# Patient Record
Sex: MALE | URBAN - METROPOLITAN AREA
[De-identification: names, ages, dates, MRNs, and addresses within clinical notes are randomized per-mention and may not be internally consistent; named-entity substitution may affect disease eponyms.]

---

## 2018-04-01 ENCOUNTER — OUTPATIENT (OUTPATIENT)
Dept: OUTPATIENT SERVICES | Facility: HOSPITAL | Age: 58
LOS: 1 days | End: 2018-04-01
Payer: MEDICAID

## 2018-04-01 PROCEDURE — G9001: CPT

## 2018-04-12 DIAGNOSIS — R69 ILLNESS, UNSPECIFIED: ICD-10-CM

## 2024-07-06 ENCOUNTER — INPATIENT (INPATIENT)
Facility: HOSPITAL | Age: 64
LOS: 2 days | Discharge: HOME | DRG: 378 | End: 2024-07-09
Attending: HOSPITALIST | Admitting: STUDENT IN AN ORGANIZED HEALTH CARE EDUCATION/TRAINING PROGRAM
Payer: MEDICAID

## 2024-07-06 VITALS
DIASTOLIC BLOOD PRESSURE: 68 MMHG | SYSTOLIC BLOOD PRESSURE: 111 MMHG | WEIGHT: 229.94 LBS | TEMPERATURE: 99 F | HEIGHT: 67 IN | RESPIRATION RATE: 20 BRPM | OXYGEN SATURATION: 100 % | HEART RATE: 120 BPM

## 2024-07-06 LAB
ALBUMIN SERPL ELPH-MCNC: 3.4 G/DL — SIGNIFICANT CHANGE UP (ref 3.3–5)
ALP SERPL-CCNC: 38 U/L — LOW (ref 40–120)
ALT FLD-CCNC: 19 U/L — SIGNIFICANT CHANGE UP (ref 10–45)
ANION GAP SERPL CALC-SCNC: 9 MMOL/L — SIGNIFICANT CHANGE UP (ref 5–17)
APTT BLD: 28.2 SEC — SIGNIFICANT CHANGE UP (ref 24.5–35.6)
AST SERPL-CCNC: 15 U/L — SIGNIFICANT CHANGE UP (ref 10–40)
BASE EXCESS BLDV CALC-SCNC: -4.1 MMOL/L — LOW (ref -2–3)
BASOPHILS # BLD AUTO: 0.03 K/UL — SIGNIFICANT CHANGE UP (ref 0–0.2)
BASOPHILS # BLD AUTO: 0.03 K/UL — SIGNIFICANT CHANGE UP (ref 0–0.2)
BASOPHILS NFR BLD AUTO: 0.3 % — SIGNIFICANT CHANGE UP (ref 0–2)
BASOPHILS NFR BLD AUTO: 0.3 % — SIGNIFICANT CHANGE UP (ref 0–2)
BILIRUB SERPL-MCNC: 0.3 MG/DL — SIGNIFICANT CHANGE UP (ref 0.2–1.2)
BLD GP AB SCN SERPL QL: NEGATIVE — SIGNIFICANT CHANGE UP
BUN SERPL-MCNC: 53 MG/DL — HIGH (ref 7–23)
CA-I SERPL-SCNC: 1.19 MMOL/L — SIGNIFICANT CHANGE UP (ref 1.15–1.33)
CALCIUM SERPL-MCNC: 8.3 MG/DL — LOW (ref 8.4–10.5)
CHLORIDE BLDV-SCNC: 109 MMOL/L — HIGH (ref 96–108)
CHLORIDE SERPL-SCNC: 109 MMOL/L — HIGH (ref 96–108)
CO2 BLDV-SCNC: 24 MMOL/L — SIGNIFICANT CHANGE UP (ref 22–26)
CO2 SERPL-SCNC: 20 MMOL/L — LOW (ref 22–31)
CREAT SERPL-MCNC: 0.84 MG/DL — SIGNIFICANT CHANGE UP (ref 0.5–1.3)
EGFR: 98 ML/MIN/1.73M2 — SIGNIFICANT CHANGE UP
EOSINOPHIL # BLD AUTO: 0.01 K/UL — SIGNIFICANT CHANGE UP (ref 0–0.5)
EOSINOPHIL # BLD AUTO: 0.02 K/UL — SIGNIFICANT CHANGE UP (ref 0–0.5)
EOSINOPHIL NFR BLD AUTO: 0.1 % — SIGNIFICANT CHANGE UP (ref 0–6)
EOSINOPHIL NFR BLD AUTO: 0.2 % — SIGNIFICANT CHANGE UP (ref 0–6)
GAS PNL BLDV: 137 MMOL/L — SIGNIFICANT CHANGE UP (ref 136–145)
GAS PNL BLDV: SIGNIFICANT CHANGE UP
GAS PNL BLDV: SIGNIFICANT CHANGE UP
GLUCOSE BLDV-MCNC: 153 MG/DL — HIGH (ref 70–99)
GLUCOSE SERPL-MCNC: 188 MG/DL — HIGH (ref 70–99)
HCO3 BLDV-SCNC: 23 MMOL/L — SIGNIFICANT CHANGE UP (ref 22–29)
HCT VFR BLD CALC: 24.4 % — LOW (ref 39–50)
HCT VFR BLD CALC: 26.4 % — LOW (ref 39–50)
HCT VFR BLDA CALC: 26 % — LOW (ref 39–51)
HGB BLD CALC-MCNC: 8.8 G/DL — LOW (ref 12.6–17.4)
HGB BLD-MCNC: 8.3 G/DL — LOW (ref 13–17)
HGB BLD-MCNC: 9.1 G/DL — LOW (ref 13–17)
IMM GRANULOCYTES NFR BLD AUTO: 0.5 % — SIGNIFICANT CHANGE UP (ref 0–0.9)
IMM GRANULOCYTES NFR BLD AUTO: 0.6 % — SIGNIFICANT CHANGE UP (ref 0–0.9)
INR BLD: 1.35 RATIO — HIGH (ref 0.85–1.18)
LACTATE BLDV-MCNC: 2 MMOL/L — SIGNIFICANT CHANGE UP (ref 0.5–2)
LIDOCAIN IGE QN: 49 U/L — SIGNIFICANT CHANGE UP (ref 7–60)
LYMPHOCYTES # BLD AUTO: 2.8 K/UL — SIGNIFICANT CHANGE UP (ref 1–3.3)
LYMPHOCYTES # BLD AUTO: 26 % — SIGNIFICANT CHANGE UP (ref 13–44)
LYMPHOCYTES # BLD AUTO: 28.8 % — SIGNIFICANT CHANGE UP (ref 13–44)
LYMPHOCYTES # BLD AUTO: 3.1 K/UL — SIGNIFICANT CHANGE UP (ref 1–3.3)
MCHC RBC-ENTMCNC: 29.4 PG — SIGNIFICANT CHANGE UP (ref 27–34)
MCHC RBC-ENTMCNC: 29.4 PG — SIGNIFICANT CHANGE UP (ref 27–34)
MCHC RBC-ENTMCNC: 34 GM/DL — SIGNIFICANT CHANGE UP (ref 32–36)
MCHC RBC-ENTMCNC: 34.5 GM/DL — SIGNIFICANT CHANGE UP (ref 32–36)
MCV RBC AUTO: 85.4 FL — SIGNIFICANT CHANGE UP (ref 80–100)
MCV RBC AUTO: 86.5 FL — SIGNIFICANT CHANGE UP (ref 80–100)
MONOCYTES # BLD AUTO: 0.54 K/UL — SIGNIFICANT CHANGE UP (ref 0–0.9)
MONOCYTES # BLD AUTO: 0.67 K/UL — SIGNIFICANT CHANGE UP (ref 0–0.9)
MONOCYTES NFR BLD AUTO: 5 % — SIGNIFICANT CHANGE UP (ref 2–14)
MONOCYTES NFR BLD AUTO: 6.2 % — SIGNIFICANT CHANGE UP (ref 2–14)
NEUTROPHILS # BLD AUTO: 6.9 K/UL — SIGNIFICANT CHANGE UP (ref 1.8–7.4)
NEUTROPHILS # BLD AUTO: 7.34 K/UL — SIGNIFICANT CHANGE UP (ref 1.8–7.4)
NEUTROPHILS NFR BLD AUTO: 64 % — SIGNIFICANT CHANGE UP (ref 43–77)
NEUTROPHILS NFR BLD AUTO: 68 % — SIGNIFICANT CHANGE UP (ref 43–77)
NRBC # BLD: 0 /100 WBCS — SIGNIFICANT CHANGE UP (ref 0–0)
NRBC # BLD: 0 /100 WBCS — SIGNIFICANT CHANGE UP (ref 0–0)
PCO2 BLDV: 48 MMHG — SIGNIFICANT CHANGE UP (ref 42–55)
PH BLDV: 7.28 — LOW (ref 7.32–7.43)
PLATELET # BLD AUTO: 212 K/UL — SIGNIFICANT CHANGE UP (ref 150–400)
PLATELET # BLD AUTO: 227 K/UL — SIGNIFICANT CHANGE UP (ref 150–400)
PO2 BLDV: 22 MMHG — LOW (ref 25–45)
POTASSIUM BLDV-SCNC: 5.1 MMOL/L — SIGNIFICANT CHANGE UP (ref 3.5–5.1)
POTASSIUM SERPL-MCNC: 4.8 MMOL/L — SIGNIFICANT CHANGE UP (ref 3.5–5.3)
POTASSIUM SERPL-SCNC: 4.8 MMOL/L — SIGNIFICANT CHANGE UP (ref 3.5–5.3)
PROT SERPL-MCNC: 5.7 G/DL — LOW (ref 6–8.3)
PROTHROM AB SERPL-ACNC: 14.1 SEC — HIGH (ref 9.5–13)
RBC # BLD: 2.82 M/UL — LOW (ref 4.2–5.8)
RBC # BLD: 3.09 M/UL — LOW (ref 4.2–5.8)
RBC # FLD: 12.9 % — SIGNIFICANT CHANGE UP (ref 10.3–14.5)
RBC # FLD: 13 % — SIGNIFICANT CHANGE UP (ref 10.3–14.5)
RH IG SCN BLD-IMP: POSITIVE — SIGNIFICANT CHANGE UP
SAO2 % BLDV: 26 % — LOW (ref 67–88)
SODIUM SERPL-SCNC: 138 MMOL/L — SIGNIFICANT CHANGE UP (ref 135–145)
TROPONIN T, HIGH SENSITIVITY RESULT: 14 NG/L — SIGNIFICANT CHANGE UP (ref 0–51)
WBC # BLD: 10.77 K/UL — HIGH (ref 3.8–10.5)
WBC # BLD: 10.78 K/UL — HIGH (ref 3.8–10.5)
WBC # FLD AUTO: 10.77 K/UL — HIGH (ref 3.8–10.5)
WBC # FLD AUTO: 10.78 K/UL — HIGH (ref 3.8–10.5)

## 2024-07-06 PROCEDURE — 74177 CT ABD & PELVIS W/CONTRAST: CPT | Mod: 26,MC

## 2024-07-06 PROCEDURE — 99291 CRITICAL CARE FIRST HOUR: CPT

## 2024-07-06 RX ORDER — PANTOPRAZOLE SODIUM 40 MG/10ML
40 INJECTION, POWDER, FOR SOLUTION INTRAVENOUS ONCE
Refills: 0 | Status: COMPLETED | OUTPATIENT
Start: 2024-07-06 | End: 2024-07-06

## 2024-07-06 RX ADMIN — PANTOPRAZOLE SODIUM 40 MILLIGRAM(S): 40 INJECTION, POWDER, FOR SOLUTION INTRAVENOUS at 20:56

## 2024-07-06 NOTE — ED ADULT TRIAGE NOTE - CHIEF COMPLAINT QUOTE
"Feeling lightheaded" x 2 days, Rectal bleeding x 2 days initially dark, black stool now stool is red.

## 2024-07-06 NOTE — ED PROVIDER NOTE - PHYSICAL EXAMINATION
General: Alert and Orientated x 3. No apparent distress.  Eyes: No scleral icterus. Pale conjunctiva   ENT: Mucous membranes moist  Cardiac: No murmurs appreciated.   Pulmonary: CTA bilaterally. No increased WOB.   Abdominal: Soft, Non-distended, non-tender  Neurologic: No focal sensory or motor deficits.  Extremities: No lower extremity edema, bruising, soreness   Skin: Color appropriate for race. Intact, warm, and well-perfused.  Psychiatric: Appropriate mood and affect. No apparent risk to self or others.

## 2024-07-06 NOTE — ED PROVIDER NOTE - PROGRESS NOTE DETAILS
John Foster DO (PGY3)  drop in Hgb, vital signs otherwise stable. will give 1uPRBC and admit to tele. GI was emailed.

## 2024-07-06 NOTE — ED ADULT NURSE NOTE - OBJECTIVE STATEMENT
65 y/o Male presents to ED from home with c/o rectal bleeding. Pt states bleeding started about 2 days ago, initially dark now it is red. Pt endorsing intermittent nausea and lightheadedness. Denies SOB, CP, HA, fever, chills, cough, V/D, abd pain, urinary s/s. Pt is A&Ox3, well appearing/ speaking full sentences without difficulty. Airway patent with spontaneous unlabored breathing,  Abd soft, nondistended. Safety maintained bed in lowest position and locked.

## 2024-07-06 NOTE — ED PROVIDER NOTE - OBJECTIVE STATEMENT
63 y/o male hx dm2, htn presents with 3 days rectal bleeding. Symptoms started with hard, dark black stools 3 days ago, for the last 24 hours he has had red, liquid stools (5 episodes today). He has begun feeling very dizzy + short of breath with any exertion, has had a few episodes of minor chest pain as well. He has never had a colonoscopy. Endorses night sweats last two nights, denies known fever/chills, abdominal pain, dysuria/hematuria, leg swelling/pain.

## 2024-07-06 NOTE — ED PROVIDER NOTE - CLINICAL SUMMARY MEDICAL DECISION MAKING FREE TEXT BOX
63 y/o male hx dm2, htn presents with 3 days rectal bleeding, dizziness, shortness of breath. He is hemodynamically stable, afebrile, tachycardic 120, on exam he appears tired with pale conjunctiva. Abdomen is soft, non-tender, non-distended. Concern for lower GI bleed w/ symptomatic anemia. Will get labs, CTA abdomen, monitor. 63 y/o male hx dm2, htn presents with 3 days rectal bleeding, dizziness, shortness of breath. He is hemodynamically stable, afebrile, tachycardic 120, on exam he appears tired with pale conjunctiva. Abdomen is soft, non-tender, non-distended. Concern for lower GI bleed w/ symptomatic anemia. Will get labs, CTA abdomen, monitor. IV hydration ,type and screen ,transfusion admission ZR

## 2024-07-07 DIAGNOSIS — E11.9 TYPE 2 DIABETES MELLITUS WITHOUT COMPLICATIONS: ICD-10-CM

## 2024-07-07 DIAGNOSIS — I10 ESSENTIAL (PRIMARY) HYPERTENSION: ICD-10-CM

## 2024-07-07 DIAGNOSIS — K62.5 HEMORRHAGE OF ANUS AND RECTUM: ICD-10-CM

## 2024-07-07 DIAGNOSIS — Z29.9 ENCOUNTER FOR PROPHYLACTIC MEASURES, UNSPECIFIED: ICD-10-CM

## 2024-07-07 LAB
APTT BLD: 26.2 SEC — SIGNIFICANT CHANGE UP (ref 24.5–35.6)
GLUCOSE BLDC GLUCOMTR-MCNC: 106 MG/DL — HIGH (ref 70–99)
GLUCOSE BLDC GLUCOMTR-MCNC: 142 MG/DL — HIGH (ref 70–99)
GLUCOSE BLDC GLUCOMTR-MCNC: 145 MG/DL — HIGH (ref 70–99)
GLUCOSE BLDC GLUCOMTR-MCNC: 161 MG/DL — HIGH (ref 70–99)
GLUCOSE BLDC GLUCOMTR-MCNC: 231 MG/DL — HIGH (ref 70–99)
HCT VFR BLD CALC: 23.2 % — LOW (ref 39–50)
HCT VFR BLD CALC: 27.7 % — LOW (ref 39–50)
HGB BLD-MCNC: 7.9 G/DL — LOW (ref 13–17)
HGB BLD-MCNC: 9 G/DL — LOW (ref 13–17)
INR BLD: 1.24 RATIO — HIGH (ref 0.85–1.18)
MCHC RBC-ENTMCNC: 28.9 PG — SIGNIFICANT CHANGE UP (ref 27–34)
MCHC RBC-ENTMCNC: 29.8 PG — SIGNIFICANT CHANGE UP (ref 27–34)
MCHC RBC-ENTMCNC: 32.5 GM/DL — SIGNIFICANT CHANGE UP (ref 32–36)
MCHC RBC-ENTMCNC: 34.1 GM/DL — SIGNIFICANT CHANGE UP (ref 32–36)
MCV RBC AUTO: 87.5 FL — SIGNIFICANT CHANGE UP (ref 80–100)
MCV RBC AUTO: 89.1 FL — SIGNIFICANT CHANGE UP (ref 80–100)
NRBC # BLD: 0 /100 WBCS — SIGNIFICANT CHANGE UP (ref 0–0)
NRBC # BLD: 0 /100 WBCS — SIGNIFICANT CHANGE UP (ref 0–0)
PLATELET # BLD AUTO: 204 K/UL — SIGNIFICANT CHANGE UP (ref 150–400)
PLATELET # BLD AUTO: 208 K/UL — SIGNIFICANT CHANGE UP (ref 150–400)
PROTHROM AB SERPL-ACNC: 12.9 SEC — SIGNIFICANT CHANGE UP (ref 9.5–13)
RBC # BLD: 2.65 M/UL — LOW (ref 4.2–5.8)
RBC # BLD: 3.11 M/UL — LOW (ref 4.2–5.8)
RBC # FLD: 13.6 % — SIGNIFICANT CHANGE UP (ref 10.3–14.5)
RBC # FLD: 13.8 % — SIGNIFICANT CHANGE UP (ref 10.3–14.5)
WBC # BLD: 13.37 K/UL — HIGH (ref 3.8–10.5)
WBC # BLD: 13.71 K/UL — HIGH (ref 3.8–10.5)
WBC # FLD AUTO: 13.37 K/UL — HIGH (ref 3.8–10.5)
WBC # FLD AUTO: 13.71 K/UL — HIGH (ref 3.8–10.5)

## 2024-07-07 PROCEDURE — 99223 1ST HOSP IP/OBS HIGH 75: CPT | Mod: GC

## 2024-07-07 PROCEDURE — 99231 SBSQ HOSP IP/OBS SF/LOW 25: CPT

## 2024-07-07 RX ORDER — DEXTROSE MONOHYDRATE 100 MG/ML
125 INJECTION, SOLUTION INTRAVENOUS ONCE
Refills: 0 | Status: DISCONTINUED | OUTPATIENT
Start: 2024-07-07 | End: 2024-07-09

## 2024-07-07 RX ORDER — DEXTROSE 30 % IN WATER 30 %
12.5 VIAL (ML) INTRAVENOUS ONCE
Refills: 0 | Status: DISCONTINUED | OUTPATIENT
Start: 2024-07-07 | End: 2024-07-09

## 2024-07-07 RX ORDER — DEXTROSE MONOHYDRATE AND SODIUM CHLORIDE 5; .3 G/100ML; G/100ML
1000 INJECTION, SOLUTION INTRAVENOUS
Refills: 0 | Status: DISCONTINUED | OUTPATIENT
Start: 2024-07-07 | End: 2024-07-09

## 2024-07-07 RX ORDER — DEXTROSE 30 % IN WATER 30 %
15 VIAL (ML) INTRAVENOUS ONCE
Refills: 0 | Status: DISCONTINUED | OUTPATIENT
Start: 2024-07-07 | End: 2024-07-09

## 2024-07-07 RX ORDER — DEXTROSE 30 % IN WATER 30 %
25 VIAL (ML) INTRAVENOUS ONCE
Refills: 0 | Status: DISCONTINUED | OUTPATIENT
Start: 2024-07-07 | End: 2024-07-09

## 2024-07-07 RX ORDER — PEG3350/SOD SULF,BICARB,CL/KCL 240-22.72G
4000 SOLUTION, RECONSTITUTED, ORAL ORAL ONCE
Refills: 0 | Status: COMPLETED | OUTPATIENT
Start: 2024-07-07 | End: 2024-07-07

## 2024-07-07 RX ORDER — GLUCAGON HYDROCHLORIDE 1 MG/ML
1 INJECTION, POWDER, FOR SOLUTION INTRAMUSCULAR; INTRAVENOUS; SUBCUTANEOUS ONCE
Refills: 0 | Status: DISCONTINUED | OUTPATIENT
Start: 2024-07-07 | End: 2024-07-09

## 2024-07-07 RX ORDER — PANTOPRAZOLE SODIUM 40 MG/10ML
40 INJECTION, POWDER, FOR SOLUTION INTRAVENOUS DAILY
Refills: 0 | Status: DISCONTINUED | OUTPATIENT
Start: 2024-07-07 | End: 2024-07-07

## 2024-07-07 RX ORDER — INSULIN LISPRO 100 [IU]/ML
INJECTION, SOLUTION SUBCUTANEOUS EVERY 6 HOURS
Refills: 0 | Status: DISCONTINUED | OUTPATIENT
Start: 2024-07-07 | End: 2024-07-08

## 2024-07-07 RX ORDER — PANTOPRAZOLE SODIUM 40 MG/10ML
40 INJECTION, POWDER, FOR SOLUTION INTRAVENOUS
Refills: 0 | Status: DISCONTINUED | OUTPATIENT
Start: 2024-07-07 | End: 2024-07-08

## 2024-07-07 RX ADMIN — PANTOPRAZOLE SODIUM 40 MILLIGRAM(S): 40 INJECTION, POWDER, FOR SOLUTION INTRAVENOUS at 18:35

## 2024-07-07 RX ADMIN — INSULIN LISPRO 1: 100 INJECTION, SOLUTION SUBCUTANEOUS at 05:34

## 2024-07-07 RX ADMIN — PANTOPRAZOLE SODIUM 40 MILLIGRAM(S): 40 INJECTION, POWDER, FOR SOLUTION INTRAVENOUS at 05:37

## 2024-07-07 RX ADMIN — Medication 4000 MILLILITER(S): at 18:25

## 2024-07-07 NOTE — PROGRESS NOTE ADULT - SUBJECTIVE AND OBJECTIVE BOX
Patient is a 64y old  Male who presents with a chief complaint of Rectal bleeding (07 Jul 2024 08:49)      SUBJECTIVE / OVERNIGHT EVENTS:    Tele reviewed:       ADDITIONAL REVIEW OF SYSTEMS: Negative except for above    MEDICATIONS  (STANDING):  dextrose 10% Bolus 125 milliLiter(s) IV Bolus once  dextrose 5%. 1000 milliLiter(s) (50 mL/Hr) IV Continuous <Continuous>  dextrose 5%. 1000 milliLiter(s) (100 mL/Hr) IV Continuous <Continuous>  dextrose 50% Injectable 25 Gram(s) IV Push once  dextrose 50% Injectable 12.5 Gram(s) IV Push once  glucagon  Injectable 1 milliGRAM(s) IntraMuscular once  insulin lispro (ADMELOG) corrective regimen sliding scale   SubCutaneous every 6 hours  pantoprazole  Injectable 40 milliGRAM(s) IV Push two times a day    MEDICATIONS  (PRN):  dextrose Oral Gel 15 Gram(s) Oral once PRN Blood Glucose LESS THAN 70 milliGRAM(s)/deciliter      CAPILLARY BLOOD GLUCOSE      POCT Blood Glucose.: 106 mg/dL (07 Jul 2024 10:53)  POCT Blood Glucose.: 142 mg/dL (07 Jul 2024 07:22)  POCT Blood Glucose.: 161 mg/dL (07 Jul 2024 05:33)  POCT Blood Glucose.: 231 mg/dL (07 Jul 2024 03:32)    I&O's Summary      PHYSICAL EXAM:  Vital Signs Last 24 Hrs  T(C): 37.2 (07 Jul 2024 13:35), Max: 37.3 (07 Jul 2024 03:26)  T(F): 99 (07 Jul 2024 13:35), Max: 99.2 (07 Jul 2024 03:26)  HR: 75 (07 Jul 2024 13:35) (75 - 120)  BP: 104/63 (07 Jul 2024 13:35) (94/70 - 128/64)  BP(mean): 76 (07 Jul 2024 02:50) (76 - 90)  RR: 18 (07 Jul 2024 13:35) (18 - 20)  SpO2: 98% (07 Jul 2024 13:35) (98% - 100%)    Parameters below as of 07 Jul 2024 13:35  Patient On (Oxygen Delivery Method): room air        PHYSICAL EXAM:        LABS:                        9.0    13.71 )-----------( 204      ( 07 Jul 2024 06:48 )             27.7     07-06    138  |  109<H>  |  53<H>  ----------------------------<  188<H>  4.8   |  20<L>  |  0.84    Ca    8.3<L>      06 Jul 2024 19:15    TPro  5.7<L>  /  Alb  3.4  /  TBili  0.3  /  DBili  x   /  AST  15  /  ALT  19  /  AlkPhos  38<L>  07-06    PT/INR - ( 07 Jul 2024 06:48 )   PT: 12.9 sec;   INR: 1.24 ratio         PTT - ( 07 Jul 2024 06:48 )  PTT:26.2 sec      Urinalysis Basic - ( 06 Jul 2024 19:15 )    Color: x / Appearance: x / SG: x / pH: x  Gluc: 188 mg/dL / Ketone: x  / Bili: x / Urobili: x   Blood: x / Protein: x / Nitrite: x   Leuk Esterase: x / RBC: x / WBC x   Sq Epi: x / Non Sq Epi: x / Bacteria: x          RADIOLOGY & ADDITIONAL TESTS:    Imaging Personally Reviewed:    Electrocardiogram Personally Reviewed:    COORDINATION OF CARE:  Care Discussed with Consultants/Other Providers [Y/N]:  Prior or Outpatient Records Reviewed [Y/N]:     Patient is a 64y old  Male who presents with a chief complaint of Rectal bleeding (07 Jul 2024 08:49)      SUBJECTIVE / OVERNIGHT EVENTS:  Patient was seen with wife at the bedside with no complaints       ADDITIONAL REVIEW OF SYSTEMS: Negative except for above    MEDICATIONS  (STANDING):  dextrose 10% Bolus 125 milliLiter(s) IV Bolus once  dextrose 5%. 1000 milliLiter(s) (50 mL/Hr) IV Continuous <Continuous>  dextrose 5%. 1000 milliLiter(s) (100 mL/Hr) IV Continuous <Continuous>  dextrose 50% Injectable 25 Gram(s) IV Push once  dextrose 50% Injectable 12.5 Gram(s) IV Push once  glucagon  Injectable 1 milliGRAM(s) IntraMuscular once  insulin lispro (ADMELOG) corrective regimen sliding scale   SubCutaneous every 6 hours  pantoprazole  Injectable 40 milliGRAM(s) IV Push two times a day    MEDICATIONS  (PRN):  dextrose Oral Gel 15 Gram(s) Oral once PRN Blood Glucose LESS THAN 70 milliGRAM(s)/deciliter      CAPILLARY BLOOD GLUCOSE      POCT Blood Glucose.: 106 mg/dL (07 Jul 2024 10:53)  POCT Blood Glucose.: 142 mg/dL (07 Jul 2024 07:22)  POCT Blood Glucose.: 161 mg/dL (07 Jul 2024 05:33)  POCT Blood Glucose.: 231 mg/dL (07 Jul 2024 03:32)    I&O's Summary      PHYSICAL EXAM:  Vital Signs Last 24 Hrs  T(C): 37.2 (07 Jul 2024 13:35), Max: 37.3 (07 Jul 2024 03:26)  T(F): 99 (07 Jul 2024 13:35), Max: 99.2 (07 Jul 2024 03:26)  HR: 75 (07 Jul 2024 13:35) (75 - 120)  BP: 104/63 (07 Jul 2024 13:35) (94/70 - 128/64)  BP(mean): 76 (07 Jul 2024 02:50) (76 - 90)  RR: 18 (07 Jul 2024 13:35) (18 - 20)  SpO2: 98% (07 Jul 2024 13:35) (98% - 100%)    Parameters below as of 07 Jul 2024 13:35  Patient On (Oxygen Delivery Method): room air        PHYSICAL EXAM:  General : obese in no acute distress   Heart  : S1S2 regular   Abdomen : obese , POs BS , NON Tender       LABS:                        9.0    13.71 )-----------( 204      ( 07 Jul 2024 06:48 )             27.7     07-06    138  |  109<H>  |  53<H>  ----------------------------<  188<H>  4.8   |  20<L>  |  0.84    Ca    8.3<L>      06 Jul 2024 19:15    TPro  5.7<L>  /  Alb  3.4  /  TBili  0.3  /  DBili  x   /  AST  15  /  ALT  19  /  AlkPhos  38<L>  07-06    PT/INR - ( 07 Jul 2024 06:48 )   PT: 12.9 sec;   INR: 1.24 ratio         PTT - ( 07 Jul 2024 06:48 )  PTT:26.2 sec      Urinalysis Basic - ( 06 Jul 2024 19:15 )    Color: x / Appearance: x / SG: x / pH: x  Gluc: 188 mg/dL / Ketone: x  / Bili: x / Urobili: x   Blood: x / Protein: x / Nitrite: x   Leuk Esterase: x / RBC: x / WBC x   Sq Epi: x / Non Sq Epi: x / Bacteria: x          RADIOLOGY & ADDITIONAL TESTS:    Imaging Personally Reviewed:    Electrocardiogram Personally Reviewed:    COORDINATION OF CARE:  Care Discussed with Consultants/Other Providers [Y/N]:  Prior or Outpatient Records Reviewed [Y/N]:

## 2024-07-07 NOTE — CHART NOTE - NSCHARTNOTEFT_GEN_A_CORE
CC: bloody bowel movement      HPI:  Called by RN to evaluate patient for bloody bowel movement x2 this evening. Patient seen and assessed at bedside with wife in attendance. Patient is alert, awake, NAD, making a call on cellphone at the time of the interview. Patient denied headache, dizziness, chest pain, shortness of breath, abdominal pain, nausea, vomiting, or dysuria. Of note, patient is admitted with rectal bleeding and is s/p pRBC x1 on admission. He is pending EGD/colonoscopy in AM and is taking Golytely for prep.         ROS:  CONSTITUTIONAL:  No fever, chills, rigors  CARDIOVASCULAR:  No chest pain or palpitations  RESPIRATORY:   No SOB, cough, wheezing  GASTROINTESTINAL:  No abd pain, N/V/D  NEUROLOGIC:  No HA, visual disturbances  SKIN: No rashes        PAST MEDICAL & SURGICAL HISTORY:  DM2 (diabetes mellitus, type 2)  HTN (hypertension)  Obesity  No significant past surgical history                Vital Signs Last 24 Hrs  T(C): 36.8 (07 Jul 2024 20:09), Max: 37.6 (07 Jul 2024 17:38)  T(F): 98.3 (07 Jul 2024 20:09), Max: 99.7 (07 Jul 2024 17:38)  HR: 90 (07 Jul 2024 20:09) (75 - 106)  BP: 104/63 (07 Jul 2024 20:09) (94/70 - 128/64)  BP(mean): 76 (07 Jul 2024 02:50) (76 - 76)  RR: 18 (07 Jul 2024 20:09) (18 - 18)  SpO2: 98% (07 Jul 2024 20:09) (96% - 100%)    Parameters below as of 07 Jul 2024 20:09  Patient On (Oxygen Delivery Method): room air          Physical Exam:  General: WN/WD male sitting on side of bed, NAD, AOx3, nontoxic appearing  Head:  NC/AT  CV: RRR, S1S2   Respiratory: CTA B/L, nonlabored on room air  Abdominal: (+) bowel sounds x4. Large, soft abdomen, NT, no guarding or rebound tenderness  MSK: No BLLE edema, + peripheral pulses, FROM all 4 extremity  Skin: (+) warm, dry   Psych: Appropriate affect         Labs:                        7.9    13.37 )-----------( 208      ( 07 Jul 2024 20:42 )             23.2     07-06    138  |  109<H>  |  53<H>  ----------------------------<  188<H>  4.8   |  20<L>  |  0.84    Ca    8.3<L>      06 Jul 2024 19:15    TPro  5.7<L>  /  Alb  3.4  /  TBili  0.3  /  DBili  x   /  AST  15  /  ALT  19  /  AlkPhos  38<L>  07-06            Radiology:  < from: CT Abdomen and Pelvis w/ IV Cont (07.06.24 @ 23:30) >  No GI bleeding is evident. No acute finding.  < end of copied text >            Assessment & Plan:  This is a 63 y/o male with PMHx of HTN, DM2, presenting for rectal bleeding. Patient reports that 3 days ago, he had been experiencing dizziness, fatigue, diaphoresis, and exertional dyspnea. He measured his BP and found it to be low systolic in 70s. Later that day, he experienced an episode of syncope, negative head strike. When he came to shortly after, his BP had improved. Soon after, he went to the bathroom and noted dark black stools initially, followed by profuse bright red bloody diarrhea.     Patient now presenting acutely with ongoing dark bloody bowel movements.    #GIB  -Vital signs hemodynamically stable, patient is asymptomatic  -STAT CBC ordered  -Maintain active T&S  -Keep Hgb >7, or if patient becomes symptomatic  -CT A/P (7/6) negative for active GIB  -Continue prep with Golytely tonight  -Patient is pending EGD/colonoscopy with GI on 7/8  -Vital signs Q4h  -The above was discussed with hospitalist Dr. Bryant who is in agreement with the above plan of care  -Will continue to closely monitor patient/vitals  -Will discuss with day team, attending to follow         Shady Brewer PA-C  Dept of Medicine  [id number]      Time-based billing (NON-critical care).   ____ minutes spent on total encounter. The necessity of the time spent during the encounter on this date of service was due to:   Need to interview and examine patient and family, coordinate care with hospitalist, place orders, document, personally review labs, and review prior medical records. CC: bloody bowel movement      HPI:  Called by RN to evaluate patient for bloody bowel movement x2 this evening. Patient seen and assessed at bedside with wife in attendance. Patient is alert, awake, NAD, making a call on cellphone at the time of the interview. Patient denied headache, dizziness, chest pain, shortness of breath, abdominal pain, nausea, vomiting, or dysuria. Of note, patient is admitted with rectal bleeding and is s/p pRBC x1 on admission. He is pending EGD/colonoscopy in the AM and is taking Golytely for prep.         ROS:  CONSTITUTIONAL:  No fever, chills, rigors  CARDIOVASCULAR:  No chest pain or palpitations  RESPIRATORY:   No SOB, cough, wheezing  GASTROINTESTINAL:  No abd pain, N/V/D  NEUROLOGIC:  No HA, visual disturbances  SKIN: No rashes        PAST MEDICAL & SURGICAL HISTORY:  DM2 (diabetes mellitus, type 2)  HTN (hypertension)  Obesity  No significant past surgical history                Vital Signs Last 24 Hrs  T(C): 36.8 (07 Jul 2024 20:09), Max: 37.6 (07 Jul 2024 17:38)  T(F): 98.3 (07 Jul 2024 20:09), Max: 99.7 (07 Jul 2024 17:38)  HR: 90 (07 Jul 2024 20:09) (75 - 106)  BP: 104/63 (07 Jul 2024 20:09) (94/70 - 128/64)  BP(mean): 76 (07 Jul 2024 02:50) (76 - 76)  RR: 18 (07 Jul 2024 20:09) (18 - 18)  SpO2: 98% (07 Jul 2024 20:09) (96% - 100%)    Parameters below as of 07 Jul 2024 20:09  Patient On (Oxygen Delivery Method): room air          Physical Exam:  General: WN/WD male sitting on side of bed, NAD, AOx3, nontoxic appearing  Head:  NC/AT  CV: RRR, S1S2   Respiratory: CTA B/L, nonlabored on room air  Abdominal: (+) bowel sounds x4. Large, soft abdomen, NT, no guarding or rebound tenderness  MSK: No BLLE edema, + peripheral pulses, FROM all 4 extremity  Skin: (+) warm, dry   Psych: Appropriate affect         Labs:                        7.9    13.37 )-----------( 208      ( 07 Jul 2024 20:42 )             23.2     07-06    138  |  109<H>  |  53<H>  ----------------------------<  188<H>  4.8   |  20<L>  |  0.84    Ca    8.3<L>      06 Jul 2024 19:15    TPro  5.7<L>  /  Alb  3.4  /  TBili  0.3  /  DBili  x   /  AST  15  /  ALT  19  /  AlkPhos  38<L>  07-06            Radiology:  < from: CT Abdomen and Pelvis w/ IV Cont (07.06.24 @ 23:30) >  No GI bleeding is evident. No acute finding.  < end of copied text >            Assessment & Plan:  This is a 63 y/o male with PMHx of HTN, DM2, presenting for rectal bleeding. Patient reports that 3 days ago, he had been experiencing dizziness, fatigue, diaphoresis, and exertional dyspnea. He measured his BP and found it to be low systolic in 70s. Later that day, he experienced an episode of syncope, negative head strike. When he came to shortly after, his BP had improved. Soon after, he went to the bathroom and noted dark black stools initially, followed by profuse bright red bloody diarrhea.     Patient now presenting acutely with ongoing dark bloody bowel movements.        #GIB with ongoing dark bloody stools ?UGIB vs slow LGIB  -Vital signs hemodynamically stable, patient is asymptomatic  -STAT CBC ordered  -Maintain active T&S  -Keep Hgb >7, or if patient becomes symptomatic  -CT A/P (7/6) negative for active GIB  -Continue prep with Golytely tonight  -Patient is pending EGD/colonoscopy with GI on 7/8  -Continue with IV PPI BID  -Vital signs Q4h  -The above was discussed with hospitalist Dr. Bryant who is in agreement with the above plan of care  -The patient and his wife were updated on the above plan of care  -Low threshold for RRT/ICU evaluation if any signs/symptoms of clinical decompensation  -Will continue to closely monitor patient/vitals  -Will discuss with day team, attending to follow         Shady Brewer PA-C  Dept of Medicine  [id number]      Time-based billing (NON-critical care).   33 minutes spent on total encounter. The necessity of the time spent during the encounter on this date of service was due to:   Need to interview and examine patient and family, coordinate care with hospitalist, place orders, document, personally review labs, and review prior medical records.

## 2024-07-07 NOTE — CONSULT NOTE ADULT - ASSESSMENT
Patient is a 65 y/o male with PMHx of HTN, DM2, presenting for rectal bleeding.     #HTN  #DMII on ozempic  #Hematochezia  GI consulted for dark stool and hematochezia w/ Hb 9--> 8.3 on admission w/ BUn/Cr 53/0.84. CT neg for GIB. Etiology possible 2/2 LGIB from diverticular bleed vs. AVM vs. upper source (elevated BUN) such as PUD. Remains HD stable w/ appropriate response to 1u pRBCs.      Patient is a 63 y/o male with PMHx of HTN, DM2, presenting for rectal bleeding.     #HTN  #DMII on ozempic  #Hematochezia  GI consulted for dark stool and hematochezia w/ Hb 9--> 8.3 on admission (from b/l 16 10/2023) w/ BUn/Cr 53/0.84. CT neg for GIB. Etiology possible 2/2 LGIB from diverticular bleed vs. AVM vs. less likely upper source (elevated BUN though HD stable) such as PUD. Remains HD stable w/ appropriate response to 1u pRBCs. No active bleed on CT.   Recommendations  -Will plan for EGD/Colon; timing TBD  -Please keep on CLD  -    Note incomplete until finalized by attending signature/attestation.    Blanca Mckeon  GI/Hepatology Fellow PGY5    NON-URGENT CONSULTS:  Please email [email or web address] OR [email or web address]  AT NIGHT AND ON WEEKENDS:  Available on Microsoft Teams  [phone] (Long Range Pager)    For urgent consults, please contact on call GI team. See Amion schedule ([facility]) or Pay4later paging system ([facility]) Patient is a 65 y/o male with PMHx of HTN, DM2, presenting for rectal bleeding.     #HTN  #DMII on ozempic  #Hematochezia  GI consulted for dark stool and hematochezia w/ Hb 9--> 8.3 on admission (from b/l 16 10/2023) w/ BUn/Cr 53/0.84. CT neg for GIB. Etiology possible 2/2 LGIB from diverticular bleed vs. AVM vs. less likely upper source (elevated BUN though HD stable) such as PUD. Remains HD stable w/ appropriate response to 1u pRBCs. No active bleed on CT.   Recommendations  -Will plan for EGD/Colon tomorrow  -Please keep on CLD  - 4L of golytely at 6PM on the day prior to colonoscopy.   - Primary team to check BM at 5am, if stool is not watery/ clear, please give additional 1-2L of golytely to be finished by 7am  - NPO after midnight; hold any TFs  - 3AM preop labs night prior to procedure: CBC, BMP, T&S, Coags  - If potassium repletion needed, please give IV repletion only     Note incomplete until finalized by attending signature/attestation.    Blanca Mckeon  GI/Hepatology Fellow PGY5    NON-URGENT CONSULTS:  Please email [email or web address] OR [email or web address]  AT NIGHT AND ON WEEKENDS:  Available on [employer] Teams  [phone] (Long Range Pager)    For urgent consults, please contact on call GI team. See Amion schedule ([facility]) or Earbitsing system ([facility])

## 2024-07-07 NOTE — H&P ADULT - NSICDXFAMILYHX_GEN_ALL_CORE_FT
FAMILY HISTORY:  No pertinent family history in first degree relatives Labs, imaging and EKG personally reviewed and interpreted by me.   labs notable for normal WBC, Hb ~9, normal lytes, Cr at baseline ~2.3  Imaging personally reviewed and interpreted by me - XR foot - no e/o fracture                           8.9    5.25  )-----------( 220      ( 30 Aug 2022 04:09 )             28.5     08-30    136  |  105  |  42<H>  ----------------------------<  205<H>  3.7   |  17<L>  |  2.34<H>    Ca    8.7      30 Aug 2022 04:09        < from: Xray Foot AP + Lateral + Oblique, Right (08.30.22 @ 05:11) >    IMPRESSION:    No acute fracture or dislocation.  Questionable bony erosion of the tuft of the first distal phalanx.   Findings are equivocal for osteomyelitis and an MRI of the forefoot may   be obtained for more definitive evaluation.    < end of copied text >

## 2024-07-07 NOTE — H&P ADULT - NSHPREVIEWOFSYSTEMS_GEN_ALL_CORE
REVIEW OF SYSTEMS:    CONSTITUTIONAL: No weakness, fevers or chills  EYES/ENT: No visual changes;  No vertigo or throat pain   NECK: No pain or stiffness  RESPIRATORY: No cough, wheezing, hemoptysis; No shortness of breath  CARDIOVASCULAR: No chest pain or palpitations  GASTROINTESTINAL: Hematochezia. No abdominal pain, nausea, vomiting.  GENITOURINARY: No dysuria, frequency or hematuria  NEUROLOGICAL: No numbness or weakness  SKIN: No itching, rashes

## 2024-07-07 NOTE — H&P ADULT - PROBLEM SELECTOR PLAN 4
- DVT ppx: SCDs  - GI ppx: PPI  - Diet: NPO pending GI     - f/u nutrition recs     - Aspiration precautions  - Activity:      - f/u PT recs     - Fall precautions  - Dispo: Pending clinical course  - GOC: Full code - DVT ppx: SCDs  - GI ppx: PPI  - Diet: CLD for now pending GI     - f/u nutrition recs     - Aspiration precautions  - Activity:      - f/u PT recs     - Fall precautions  - Dispo: Pending clinical course  - GOC: Full code

## 2024-07-07 NOTE — H&P ADULT - ASSESSMENT
63 y/o male with PMHx of HTN, DM2, presenting for rectal bleeding. Associated with symptoms and low BP, s/p 1u PRBC in ED. Admitted for further assessment and management.

## 2024-07-07 NOTE — H&P ADULT - ATTENDING COMMENTS
64M PMHx HTN and T2DM presenting with 3 days of dark/black stools as well as multiple episodes of presyncope. This past day also had multiple episodes of BRBPR as well. Has never had a colonoscopy. Hypotensive w/ SBP 70s at home. Tachycardic on presentation here, anemic to 9.1, unknown baseline. Repeat CBC here was 8.3 and patient symptomatic, so received 1u pRBCs. Admitted for GIB workup.    #GI bleed  #Pre-syncope  #HTN  #T2DM    - Given history of melena and BRBPR these past 3 days, would benefit from both EGD and colonoscopy  - GI consult  - Transfuse for Hb < 7, keep active type & screen  - Clear liquid diet for today  - Patient states he never actually lost consciousness, just was very weak and lightheaded during these episodes and couldn't stay standing  - ECG personally reviewed, sinus tachycardia  - Hold lisinopril for soft BPs  - ISS while admitted    Rest of care per resident note

## 2024-07-07 NOTE — H&P ADULT - PROBLEM SELECTOR PLAN 1
- BRBPR preceded by dark black stool, associated with dizziness, dyspnea, hypotension  - s/p 1u PRBC in ED, pt continues to have bleeding with BM  - CT with IV contrast showing no active bleeding in GI tract  - Possibly due to hemorrhoids not visualized on CT  - GI consulted, f/u recs  - PPI IV qD  - Monitor Hb, transfuse >7 or if symptomatic - BRBPR preceded by dark black stool, associated with dizziness, dyspnea, hypotension  - s/p 1u PRBC in ED, pt continues to have bleeding with BM  - CT with IV contrast showing no active bleeding in GI tract  - Possibly due to hemorrhoids not visualized on CT  - GI consulted, f/u recs  - PPI IV BID  - Monitor Hb, transfuse >7 or if symptomatic

## 2024-07-07 NOTE — PHYSICAL THERAPY INITIAL EVALUATION ADULT - ADDITIONAL COMMENTS
Lives with spouse in pvt house, 2 steps to enter, none inside. Fully independent in all ADL's and Ambulation. No assistive device used. Still works in [employer].

## 2024-07-07 NOTE — H&P ADULT - NSHPPHYSICALEXAM_GEN_ALL_CORE
VITALS:   T(C): 37.3 (07-07-24 @ 03:26), Max: 37.3 (07-07-24 @ 03:26)  HR: 100 (07-07-24 @ 03:26) (100 - 120)  BP: 103/65 (07-07-24 @ 03:26) (103/65 - 128/64)  RR: 18 (07-07-24 @ 03:26) (18 - 20)  SpO2: 99% (07-07-24 @ 03:26) (99% - 100%)    GENERAL: Turning in bed uncomfortable; obese  HEAD:  Atraumatic, normocephalic  EYES: EOMI, PERRLA, pale sclera  NECK: Supple, no JVD  HEART: Regular rate and rhythm, no murmurs, rubs, or gallops  LUNGS: Unlabored respirations.  Clear to auscultation bilaterally, no crackles, wheezing, or rhonchi  ABDOMEN: Soft, nontender, nondistended, +BS; rectal exam deferred by patient  EXTREMITIES: 2+ peripheral pulses bilaterally. No clubbing, cyanosis, or edema  NERVOUS SYSTEM:  A&Ox3, no focal deficits   SKIN: No rashes or lesions

## 2024-07-07 NOTE — CONSULT NOTE ADULT - ATTENDING COMMENTS
Agree with above. Patient with drop in H/H and had initial hypotension at home. Stools was initially dark then turned red. Cannot rule out brisk upper GI bleeding, though more likely lower GI bleeding. No overt abnormalities seen on CT with IV contrast. Plan for EGD/colonoscopy for further evaluation. Continue empiric PPI in the meantime, trend H/H, transfuse as needed.

## 2024-07-07 NOTE — PHYSICAL THERAPY INITIAL EVALUATION ADULT - PERTINENT HX OF CURRENT PROBLEM, REHAB EVAL
Patient is a 63 y/o male adm to [facility] on7/7/24 with PMHx of HTN, DM2, presenting for rectal bleeding. Patient reports that 3 days ago, he had been experiencing dizziness, fatigue, diaphoresis, and exertional dyspnea. He measured his BP and found it to be low systolic in 70s. Later that day, he experienced an episode of syncope, negative head strike. When he came to shortly after, his BP had improved. Soon after, he went to the bathroom and noted dark black stools initially, followed by profuse bright red bloody diarrhea. He denies any fever, chills, abdominal pain, nausea, vomiting, or other acute symptoms.    In ED, patient found to be tachycardic 120s, Hb 9.1, however given symptomatic nature of anemia he was transfused with 1u PRBC. No

## 2024-07-07 NOTE — CONSULT NOTE ADULT - SUBJECTIVE AND OBJECTIVE BOX
INITIAL GI CONSULTATION  HPI:  Patient is a 65 y/o male with PMHx of HTN, DM2, presenting for rectal bleeding.     Patient reports that 3 days ago, he had been experiencing dizziness, fatigue, diaphoresis, and exertional dyspnea. He measured his BP and found it to be low systolic in 70s. Later that day, he experienced an episode of syncope, negative head strike. When he came to shortly after, his BP had improved. Soon after, he went to the bathroom and noted dark black stools initially, followed by profuse bright red bloody diarrhea. He denies any fever, chills, abdominal pain, nausea, vomiting, or other acute symptoms.    On arrival to ED, initially tachy to 120s, improved to low 100s. BPs 103/65. Labs showed Hb 9.1 (no prior) w/ mcv 85, BUN/Cr 53/0.84, lactate 2.0. CT A/P w/o acute GIB. Was given 1u pRBcs for Hb 8.3 w/ improvement to 9. Of note is on ozempic.        ASA/NSAIDs/anticoagulation: None  )      FamHx: ***no h/o GI malignancies known  PMH/PSH:  PAST MEDICAL & SURGICAL HISTORY:  DM2 (diabetes mellitus, type 2)      HTN (hypertension)      Obesity      No significant past surgical history          MEDS:  MEDICATIONS  (STANDING):  dextrose 10% Bolus 125 milliLiter(s) IV Bolus once  dextrose 5%. 1000 milliLiter(s) (100 mL/Hr) IV Continuous <Continuous>  dextrose 5%. 1000 milliLiter(s) (50 mL/Hr) IV Continuous <Continuous>  dextrose 50% Injectable 12.5 Gram(s) IV Push once  dextrose 50% Injectable 25 Gram(s) IV Push once  glucagon  Injectable 1 milliGRAM(s) IntraMuscular once  insulin lispro (ADMELOG) corrective regimen sliding scale   SubCutaneous every 6 hours  pantoprazole  Injectable 40 milliGRAM(s) IV Push two times a day    MEDICATIONS  (PRN):  dextrose Oral Gel 15 Gram(s) Oral once PRN Blood Glucose LESS THAN 70 milliGRAM(s)/deciliter    Allergies    No Known Allergies    Intolerances          ______________________________________________________________________  PHYSICAL EXAM:  T(C): 37.3 (07-07-24 @ 05:18), Max: 37.3 (07-07-24 @ 03:26)  HR: 101 (07-07-24 @ 05:18)  BP: 109/75 (07-07-24 @ 05:18)  RR: 18 (07-07-24 @ 05:18)  SpO2: 99% (07-07-24 @ 05:18)  Wt(kg): --    GEN: NAD, normocephalic  CVS: Normal rate, HD stable  CHEST: No signs of respiratory distress, breathing comfortably, no accessory muscle usage  ABD: soft , nontender, nondistended, bowel sounds present  EXTR: no cyanosis, no clubbing, no edema  NEURO: Awake and alert, conversant  SKIN:  warm;  non icteric      Labs/Imaging reviewed.                        9.0    13.71 )-----------( 204 ( 07 Jul 2024 06:48 )             27.7     Last Hb:Hemoglobin: 9.0 g/dL (07-07-24 @ 06:48)  Hemoglobin: 8.3 g/dL (07-06-24 @ 23:04)  Hemoglobin: 9.1 g/dL (07-06-24 @ 19:15)               138   |  109   |  53                 Ca: 8.3    BMP:   ----------------------------< 188    Mg: x     (07-06-24 @ 19:15)             4.8    |  20    | 0.84               Ph: x        LFT:     TPro: 5.7 / Alb: 3.4 / TBili: 0.3 / DBili: x / AST: 15 / ALT: 19 / AlkPhos: 38   (07-06-24 @ 19:15)    Creatinine: 0.84 mg/dL      BUN/Cr: Blood Urea Nitrogen: 53 mg/dL (07-06-24 @ 19:15)  /Creatinine: 0.84 mg/dL (07-06-24 @ 19:15)    AST/ALTAspartate Aminotransferase (AST/SGOT): 15 U/L (07-06-24 @ 19:15)  /Alanine Aminotransferase (ALT/SGPT): 19 U/L (07-06-24 @ 19:15)    ALP Alkaline Phosphatase: 38 U/L (07-06-24 @ 19:15)    T/Dbili /  INR: INR: 1.24 ratio (07-07-24 @ 06:48)      EGD/[place]:      Imaging:  CT Abdomen and Pelvis w/ IV Cont:   ACC: [id number] EXAM:  CT ABDOMEN AND PELVIS IC   ORDERED BY:  CYNTHIA ACOSTA     PROCEDURE DATE:  07/06/2024          INTERPRETATION:  CLINICAL INFORMATION: Rectal bleeding. Tachycardia.    COMPARISON: None.    CONTRAST/COMPLICATIONS:  IV Contrast:Omnipaque 350  90 cc administered   10 cc discarded  Oral Contrast: NONE  Complications: None reported at time of study completion    PROCEDURE:  CT of the Abdomen and Pelvis was performed.  Precontrast, Arterial and Delayed phases were performed.  Sagittal and coronal reformats were performed.    FINDINGS:  LOWER CHEST: Within normal limits.    LIVER: Within normal limits.  BILE DUCTS: Normal caliber.  GALLBLADDER: Within normal limits.  SPLEEN: Within normal limits.  PANCREAS: Within normal limits.  ADRENALS: Within normal limits.  KIDNEYS/URETERS: Simple cysts. No stone or hydronephrosis or concerning   mass.    BLADDER: Within normal limits.  REPRODUCTIVE ORGANS: Prostate within normal limits.    BOWEL: No bowel obstruction. Appendix is normal. No GI bleeding is   evident.  PERITONEUM/RETROPERITONEUM: Within normal limits.  VESSELS: Within normal limits.  LYMPH NODES: No lymphadenopathy.  ABDOMINAL WALL: Within normal limits.  BONES: Degenerative changes. No fracture or aggressive osseous lesion.    IMPRESSION:  No GI bleeding is evident. No acute finding.       INITIAL GI CONSULTATION  HPI:  Patient is a 65 y/o male with PMHx of HTN, DM2, presenting for rectal bleeding.     Patient reports that 3 days ago, he had been experiencing dizziness, fatigue, diaphoresis, and exertional dyspnea. He measured his BP and found it to be low systolic in 70s. Later that day, he experienced an episode of syncope, negative head strike. When he came to shortly after, his BP had improved. Soon after, he went to the bathroom and noted dark black stools initially, followed by profuse bright red bloody diarrhea. He notes that for past few days he has had multiple episodes of toilet bowl filled with red, no clots and no formed stool when going. No similar sxs in past.     On arrival to ED, initially tachy to 120s, improved to low 100s. BPs 103/65. Labs showed Hb 9.1 (10/2023 was 16 per records with wife) w/ mcv 85, BUN/Cr 53/0.84, lactate 2.0. CT A/P w/o acute GIB. Was given 1u pRBcs for Hb 8.3 w/ improvement to 9. Of note is on ozempic. He feels much improved after 1u pRBCs. Has never had colonoscopy or endoscopy. Denies NSAIDs, smoking or drinking. Not on blood thinners or AC.      ASA/NSAIDs/anticoagulation: None      FamHx: ***no h/o GI malignancies known  PMH/PSH:  PAST MEDICAL & SURGICAL HISTORY:  DM2 (diabetes mellitus, type 2)      HTN (hypertension)      Obesity      No significant past surgical history          MEDS:  MEDICATIONS  (STANDING):  dextrose 10% Bolus 125 milliLiter(s) IV Bolus once  dextrose 5%. 1000 milliLiter(s) (100 mL/Hr) IV Continuous <Continuous>  dextrose 5%. 1000 milliLiter(s) (50 mL/Hr) IV Continuous <Continuous>  dextrose 50% Injectable 12.5 Gram(s) IV Push once  dextrose 50% Injectable 25 Gram(s) IV Push once  glucagon  Injectable 1 milliGRAM(s) IntraMuscular once  insulin lispro (ADMELOG) corrective regimen sliding scale   SubCutaneous every 6 hours  pantoprazole  Injectable 40 milliGRAM(s) IV Push two times a day    MEDICATIONS  (PRN):  dextrose Oral Gel 15 Gram(s) Oral once PRN Blood Glucose LESS THAN 70 milliGRAM(s)/deciliter    Allergies    No Known Allergies    Intolerances          ______________________________________________________________________  PHYSICAL EXAM:  T(C): 37.3 (07-07-24 @ 05:18), Max: 37.3 (07-07-24 @ 03:26)  HR: 101 (07-07-24 @ 05:18)  BP: 109/75 (07-07-24 @ 05:18)  RR: 18 (07-07-24 @ 05:18)  SpO2: 99% (07-07-24 @ 05:18)  Wt(kg): --    GEN: NAD, normocephalic  CVS: Normal rate, HD stable  CHEST: No signs of respiratory distress, breathing comfortably, no accessory muscle usage  ABD: soft , nontender, nondistended, red streaks on MONIE, no external hemorrhoids  EXTR: no cyanosis, no clubbing, no edema  NEURO: Awake and alert, conversant  SKIN:  warm;  non icteric      Labs/Imaging reviewed.                        9.0    13.71 )-----------( 204      ( 07 Jul 2024 06:48 )             27.7     Last Hb:Hemoglobin: 9.0 g/dL (07-07-24 @ 06:48)  Hemoglobin: 8.3 g/dL (07-06-24 @ 23:04)  Hemoglobin: 9.1 g/dL (07-06-24 @ 19:15)               138   |  109   |  53                 Ca: 8.3    BMP:   ----------------------------< 188    Mg: x     (07-06-24 @ 19:15)             4.8    |  20    | 0.84               Ph: x        LFT:     TPro: 5.7 / Alb: 3.4 / TBili: 0.3 / DBili: x / AST: 15 / ALT: 19 / AlkPhos: 38   (07-06-24 @ 19:15)    Creatinine: 0.84 mg/dL      BUN/Cr: Blood Urea Nitrogen: 53 mg/dL (07-06-24 @ 19:15)  /Creatinine: 0.84 mg/dL (07-06-24 @ 19:15)    AST/ALTAspartate Aminotransferase (AST/SGOT): 15 U/L (07-06-24 @ 19:15)  /Alanine Aminotransferase (ALT/SGPT): 19 U/L (07-06-24 @ 19:15)    ALP Alkaline Phosphatase: 38 U/L (07-06-24 @ 19:15)    T/Dbili /  INR: INR: 1.24 ratio (07-07-24 @ 06:48)      EGD/[place]:      Imaging:  CT Abdomen and Pelvis w/ IV Cont:   ACC: [id number] EXAM:  CT ABDOMEN AND PELVIS IC   ORDERED BY:  CYNTHIA ACOSTA     PROCEDURE DATE:  07/06/2024          INTERPRETATION:  CLINICAL INFORMATION: Rectal bleeding. Tachycardia.    COMPARISON: None.    CONTRAST/COMPLICATIONS:  IV Contrast:Omnipaque 350  90 cc administered   10 cc discarded  Oral Contrast: NONE  Complications: None reported at time of study completion    PROCEDURE:  CT of the Abdomen and Pelvis was performed.  Precontrast, Arterial and Delayed phases were performed.  Sagittal and coronal reformats were performed.    FINDINGS:  LOWER CHEST: Within normal limits.    LIVER: Within normal limits.  BILE DUCTS: Normal caliber.  GALLBLADDER: Within normal limits.  SPLEEN: Within normal limits.  PANCREAS: Within normal limits.  ADRENALS: Within normal limits.  KIDNEYS/URETERS: Simple cysts. No stone or hydronephrosis or concerning   mass.    BLADDER: Within normal limits.  REPRODUCTIVE ORGANS: Prostate within normal limits.    BOWEL: No bowel obstruction. Appendix is normal. No GI bleeding is   evident.  PERITONEUM/RETROPERITONEUM: Within normal limits.  VESSELS: Within normal limits.  LYMPH NODES: No lymphadenopathy.  ABDOMINAL WALL: Within normal limits.  BONES: Degenerative changes. No fracture or aggressive osseous lesion.    IMPRESSION:  No GI bleeding is evident. No acute finding.

## 2024-07-07 NOTE — H&P ADULT - NSHPLABSRESULTS_GEN_ALL_CORE
LABS:                            8.3    10.78 )-----------( 212      ( 06 Jul 2024 23:04 )             24.4     07-06    138  |  109<H>  |  53<H>  ----------------------------<  188<H>  4.8   |  20<L>  |  0.84    Ca    8.3<L>      06 Jul 2024 19:15    TPro  5.7<L>  /  Alb  3.4  /  TBili  0.3  /  DBili  x   /  AST  15  /  ALT  19  /  AlkPhos  38<L>  07-06    LIVER FUNCTIONS - ( 06 Jul 2024 19:15 )  Alb: 3.4 g/dL / Pro: 5.7 g/dL / ALK PHOS: 38 U/L / ALT: 19 U/L / AST: 15 U/L / GGT: x           PT/INR - ( 06 Jul 2024 19:15 )   PT: 14.1 sec;   INR: 1.35 ratio         PTT - ( 06 Jul 2024 19:15 )  PTT:28.2 sec        Urinalysis Basic - ( 06 Jul 2024 19:15 )    Color: x / Appearance: x / SG: x / pH: x  Gluc: 188 mg/dL / Ketone: x  / Bili: x / Urobili: x   Blood: x / Protein: x / Nitrite: x   Leuk Esterase: x / RBC: x / WBC x   Sq Epi: x / Non Sq Epi: x / Bacteria: x        RADIOLOGY: Reviewed    < from: CT Abdomen and Pelvis w/ IV Cont (07.06.24 @ 23:30) >    IMPRESSION:  No GI bleeding is evident. No acute finding.    < end of copied text >    EKG: Sinus tach 112 bpm

## 2024-07-07 NOTE — PROVIDER CONTACT NOTE (OTHER) - ASSESSMENT
on assessment pt denies chest pain and SOB. pt denies feeling lightheaded or dizziness. pts VS: /63, HR 90, temp 98.3, O2 98% RA. pt started on golytely pep. pt pending colonoscopy tomorrow. pt had BM in bedside commode.

## 2024-07-08 ENCOUNTER — RESULT REVIEW (OUTPATIENT)
Age: 64
End: 2024-07-08

## 2024-07-08 LAB
A1C WITH ESTIMATED AVERAGE GLUCOSE RESULT: 5.5 % — SIGNIFICANT CHANGE UP (ref 4–5.6)
ANION GAP SERPL CALC-SCNC: 7 MMOL/L — SIGNIFICANT CHANGE UP (ref 5–17)
APTT BLD: 26 SEC — SIGNIFICANT CHANGE UP (ref 24.5–35.6)
BLD GP AB SCN SERPL QL: NEGATIVE — SIGNIFICANT CHANGE UP
BUN SERPL-MCNC: 29 MG/DL — HIGH (ref 7–23)
CALCIUM SERPL-MCNC: 7.9 MG/DL — LOW (ref 8.4–10.5)
CHLORIDE SERPL-SCNC: 111 MMOL/L — HIGH (ref 96–108)
CO2 SERPL-SCNC: 26 MMOL/L — SIGNIFICANT CHANGE UP (ref 22–31)
CREAT SERPL-MCNC: 0.86 MG/DL — SIGNIFICANT CHANGE UP (ref 0.5–1.3)
EGFR: 97 ML/MIN/1.73M2 — SIGNIFICANT CHANGE UP
ESTIMATED AVERAGE GLUCOSE: 111 MG/DL — SIGNIFICANT CHANGE UP (ref 68–114)
GLUCOSE BLDC GLUCOMTR-MCNC: 104 MG/DL — HIGH (ref 70–99)
GLUCOSE BLDC GLUCOMTR-MCNC: 123 MG/DL — HIGH (ref 70–99)
GLUCOSE BLDC GLUCOMTR-MCNC: 134 MG/DL — HIGH (ref 70–99)
GLUCOSE BLDC GLUCOMTR-MCNC: 136 MG/DL — HIGH (ref 70–99)
GLUCOSE SERPL-MCNC: 124 MG/DL — HIGH (ref 70–99)
HCT VFR BLD CALC: 19.4 % — CRITICAL LOW (ref 39–50)
HCT VFR BLD CALC: 21.8 % — LOW (ref 39–50)
HCT VFR BLD CALC: 24.3 % — LOW (ref 39–50)
HGB BLD-MCNC: 6.8 G/DL — CRITICAL LOW (ref 13–17)
HGB BLD-MCNC: 7.6 G/DL — LOW (ref 13–17)
HGB BLD-MCNC: 8.4 G/DL — LOW (ref 13–17)
INR BLD: 1.24 RATIO — HIGH (ref 0.85–1.18)
MCHC RBC-ENTMCNC: 30 PG — SIGNIFICANT CHANGE UP (ref 27–34)
MCHC RBC-ENTMCNC: 30.2 PG — SIGNIFICANT CHANGE UP (ref 27–34)
MCHC RBC-ENTMCNC: 30.6 PG — SIGNIFICANT CHANGE UP (ref 27–34)
MCHC RBC-ENTMCNC: 34.6 GM/DL — SIGNIFICANT CHANGE UP (ref 32–36)
MCHC RBC-ENTMCNC: 34.9 GM/DL — SIGNIFICANT CHANGE UP (ref 32–36)
MCHC RBC-ENTMCNC: 35.1 GM/DL — SIGNIFICANT CHANGE UP (ref 32–36)
MCV RBC AUTO: 86.5 FL — SIGNIFICANT CHANGE UP (ref 80–100)
MCV RBC AUTO: 86.8 FL — SIGNIFICANT CHANGE UP (ref 80–100)
MCV RBC AUTO: 87.4 FL — SIGNIFICANT CHANGE UP (ref 80–100)
NRBC # BLD: 0 /100 WBCS — SIGNIFICANT CHANGE UP (ref 0–0)
PLATELET # BLD AUTO: 162 K/UL — SIGNIFICANT CHANGE UP (ref 150–400)
PLATELET # BLD AUTO: 174 K/UL — SIGNIFICANT CHANGE UP (ref 150–400)
PLATELET # BLD AUTO: 184 K/UL — SIGNIFICANT CHANGE UP (ref 150–400)
POTASSIUM SERPL-MCNC: 3.8 MMOL/L — SIGNIFICANT CHANGE UP (ref 3.5–5.3)
POTASSIUM SERPL-SCNC: 3.8 MMOL/L — SIGNIFICANT CHANGE UP (ref 3.5–5.3)
PROTHROM AB SERPL-ACNC: 13.6 SEC — HIGH (ref 9.5–13)
RBC # BLD: 2.22 M/UL — LOW (ref 4.2–5.8)
RBC # BLD: 2.52 M/UL — LOW (ref 4.2–5.8)
RBC # BLD: 2.8 M/UL — LOW (ref 4.2–5.8)
RBC # FLD: 13.8 % — SIGNIFICANT CHANGE UP (ref 10.3–14.5)
RBC # FLD: 13.9 % — SIGNIFICANT CHANGE UP (ref 10.3–14.5)
RBC # FLD: 14.1 % — SIGNIFICANT CHANGE UP (ref 10.3–14.5)
RH IG SCN BLD-IMP: POSITIVE — SIGNIFICANT CHANGE UP
SODIUM SERPL-SCNC: 144 MMOL/L — SIGNIFICANT CHANGE UP (ref 135–145)
WBC # BLD: 8.72 K/UL — SIGNIFICANT CHANGE UP (ref 3.8–10.5)
WBC # BLD: 9.19 K/UL — SIGNIFICANT CHANGE UP (ref 3.8–10.5)
WBC # BLD: 9.72 K/UL — SIGNIFICANT CHANGE UP (ref 3.8–10.5)
WBC # FLD AUTO: 8.72 K/UL — SIGNIFICANT CHANGE UP (ref 3.8–10.5)
WBC # FLD AUTO: 9.19 K/UL — SIGNIFICANT CHANGE UP (ref 3.8–10.5)
WBC # FLD AUTO: 9.72 K/UL — SIGNIFICANT CHANGE UP (ref 3.8–10.5)

## 2024-07-08 PROCEDURE — 99233 SBSQ HOSP IP/OBS HIGH 50: CPT

## 2024-07-08 PROCEDURE — 88305 TISSUE EXAM BY PATHOLOGIST: CPT | Mod: 26

## 2024-07-08 PROCEDURE — 43239 EGD BIOPSY SINGLE/MULTIPLE: CPT | Mod: GC

## 2024-07-08 PROCEDURE — 99223 1ST HOSP IP/OBS HIGH 75: CPT | Mod: GC,25

## 2024-07-08 PROCEDURE — 45378 DIAGNOSTIC COLONOSCOPY: CPT | Mod: GC

## 2024-07-08 PROCEDURE — 88342 IMHCHEM/IMCYTCHM 1ST ANTB: CPT | Mod: 26

## 2024-07-08 DEVICE — NET RETRV ROT ROTH 2.5MMX230CM: Type: IMPLANTABLE DEVICE | Status: FUNCTIONAL

## 2024-07-08 RX ORDER — INSULIN LISPRO 100 [IU]/ML
INJECTION, SOLUTION SUBCUTANEOUS AT BEDTIME
Refills: 0 | Status: DISCONTINUED | OUTPATIENT
Start: 2024-07-08 | End: 2024-07-09

## 2024-07-08 RX ORDER — INSULIN LISPRO 100 [IU]/ML
INJECTION, SOLUTION SUBCUTANEOUS
Refills: 0 | Status: DISCONTINUED | OUTPATIENT
Start: 2024-07-08 | End: 2024-07-09

## 2024-07-08 RX ORDER — PANTOPRAZOLE SODIUM 40 MG/10ML
40 INJECTION, POWDER, FOR SOLUTION INTRAVENOUS
Refills: 0 | Status: DISCONTINUED | OUTPATIENT
Start: 2024-07-09 | End: 2024-07-09

## 2024-07-08 RX ADMIN — PANTOPRAZOLE SODIUM 40 MILLIGRAM(S): 40 INJECTION, POWDER, FOR SOLUTION INTRAVENOUS at 05:33

## 2024-07-08 RX ADMIN — PANTOPRAZOLE SODIUM 40 MILLIGRAM(S): 40 INJECTION, POWDER, FOR SOLUTION INTRAVENOUS at 17:28

## 2024-07-08 NOTE — PROGRESS NOTE ADULT - ASSESSMENT
63 y/o male with PMHx of HTN and DM2 who presents with rectal bleed and acute blood loss anemia requiring PRBC transfusion. Now s/p EGD/colonoscopy with no signs of active bleed though non-bleeding gastric ulcer and mild gastritis seen on endoscopic eval.
63 y/o male with PMHx of HTN, DM2, presenting for rectal bleeding. Associated with symptoms and low BP, s/p 1u PRBC in ED. Admitted for further assessment and management.

## 2024-07-08 NOTE — PROGRESS NOTE ADULT - PROBLEM SELECTOR PLAN 2
well controlled. on ozempic.  HbA1c 5.5%  - c/w low dose sliding scale  - carb consistent diet
- FS q6 while NPO  - LDISS

## 2024-07-08 NOTE — PRE PROCEDURE NOTE - PRE PROCEDURE EVALUATION
Attending Physician:  Dr Wallace                            Procedure: EGD/colonoscopy     Indication for Procedure: GIB  ________________________________________________________  PAST MEDICAL & SURGICAL HISTORY:  DM2 (diabetes mellitus, type 2)      HTN (hypertension)      Obesity      No significant past surgical history        ALLERGIES:  No Known Allergies    HOME MEDICATIONS:  lisinopril 20 mg oral tablet: 1 tab(s) orally once a day  Ozempic 2 mg/1.5 mL (0.25 mg or 0.5 mg dose) subcutaneous solution: 0.5 milligram(s) subcutaneously once a week on Sundays    AICD/PPM: [ ] yes   [x ] no    PERTINENT LAB DATA:                        8.4    9.72  )-----------( 184      ( 08 Jul 2024 09:23 )             24.3     07-08    144  |  111<H>  |  29<H>  ----------------------------<  124<H>  3.8   |  26  |  0.86    Ca    7.9<L>      08 Jul 2024 02:31    TPro  5.7<L>  /  Alb  3.4  /  TBili  0.3  /  DBili  x   /  AST  15  /  ALT  19  /  AlkPhos  38<L>  07-06    PT/INR - ( 08 Jul 2024 02:31 )   PT: 13.6 sec;   INR: 1.24 ratio         PTT - ( 08 Jul 2024 02:31 )  PTT:26.0 sec            PHYSICAL EXAMINATION:    T(C): 36.8  HR: 81  BP: 104/65  RR: 18  SpO2: 99%    Constitutional: NAD    Neck:  No JVD  Respiratory:  No resp distress   Cardiovascular: RRR  Extremities: No peripheral edema  Neurological: A/O x 4, no focal deficits        COMMENTS:    The patient is a suitable candidate for the planned procedure unless box checked [ ]  No, explain:

## 2024-07-08 NOTE — PROGRESS NOTE ADULT - NSPROGADDITIONALINFOA_GEN_ALL_CORE
Sherry Cha   Hospitalist   available on TEAMS
.  Anabel Parnell MD  Division of Hospital Medicine  [facility]   Available on Microsoft Teams - messages preferred prior to calls.    Plan discussed with patient, wife bedside, and medicine NP Renu.

## 2024-07-08 NOTE — PROGRESS NOTE ADULT - PROBLEM SELECTOR PLAN 4
- DVT ppx: SCDs  - GI ppx: PPI  - Diet: CLD for now pending GI  - Dispo: Pending clinical course  - GOC: Full code
DVT ppx: SCDs  GI ppx: on PPI    Dispo: pending stable H/H and GI clearance

## 2024-07-08 NOTE — PROGRESS NOTE ADULT - PROBLEM SELECTOR PLAN 3
- Holding home lisinopril
BP remains marginal.   - continue to hold home lisinopril  - monitor vitals

## 2024-07-08 NOTE — PRE-ANESTHESIA EVALUATION ADULT - NSANTHPMHFT_GEN_ALL_CORE
64M with PMH s/f HTN, DM2 who presented with rectal bleeding associated with symptoms and low BP, s/p 1u PRBC in ED. Now presents for EGD and colonoscopy

## 2024-07-08 NOTE — PROGRESS NOTE ADULT - PROBLEM SELECTOR PLAN 1
- BRBPR preceded by dark black stool, associated with dizziness, dyspnea, hypotension  - s/p 1u PRBC in ED with stable Hem now   - CT with IV contrast showing no active bleeding in GI tract  - GI consulted--> plan for scope date and time is to be determined   - CW PPI IV BID  - Monitor Hb, transfuse >7 or if symptomatic  - CBC stat is ordered   Monitor hemodynamics
- BRBPR preceded by dark black stool, associated with dizziness, dyspnea, hypotension  - s/p 1u PRBC in ED and additional 1u PRBC on 7/8  - CT with IV contrast showing no active bleeding in GI tract  - GI consulted, recs appreciated  - 7/8 EGD with no active bleed but non-bleeding gastric ulcer seen with mild gastritis. biopsies obtained.  - 7/8 Colonoscopy with no evidence of bleed; non-bleeding hemorrhoids seen  - c/w clears for now  - if bleeding continues, may need capsule study  - pantoprazole 40mg BID x 2 weeks followed by daily as per GI  - monitor H/H and transfuse for Hb<7

## 2024-07-08 NOTE — PROGRESS NOTE ADULT - SUBJECTIVE AND OBJECTIVE BOX
Anabel Parnell MD  Division of Hospital Medicine  [facility]   Available on Microsoft Teams (Mon-Fri 8am-5pm)    * messages preferred prior to calls  Other Times:  [phone]      Patient is a 64y old  Male who presents with a chief complaint of Rectal bleeding (07 Jul 2024 17:19)      SUBJECTIVE / OVERNIGHT EVENTS: no acute events overnight. last episode of hematochezia was last night after which stools turned clear from GI prep. denies dizziness, n/v, nor abd pain.  ADDITIONAL REVIEW OF SYSTEMS:    MEDICATIONS  (STANDING):  dextrose 10% Bolus 125 milliLiter(s) IV Bolus once  dextrose 5%. 1000 milliLiter(s) (50 mL/Hr) IV Continuous <Continuous>  dextrose 5%. 1000 milliLiter(s) (100 mL/Hr) IV Continuous <Continuous>  dextrose 50% Injectable 25 Gram(s) IV Push once  dextrose 50% Injectable 12.5 Gram(s) IV Push once  glucagon  Injectable 1 milliGRAM(s) IntraMuscular once  insulin lispro (ADMELOG) corrective regimen sliding scale   SubCutaneous at bedtime  insulin lispro (ADMELOG) corrective regimen sliding scale   SubCutaneous three times a day before meals  pantoprazole  Injectable 40 milliGRAM(s) IV Push two times a day    MEDICATIONS  (PRN):  dextrose Oral Gel 15 Gram(s) Oral once PRN Blood Glucose LESS THAN 70 milliGRAM(s)/deciliter      CAPILLARY BLOOD GLUCOSE      POCT Blood Glucose.: 136 mg/dL (08 Jul 2024 16:58)  POCT Blood Glucose.: 123 mg/dL (08 Jul 2024 14:29)  POCT Blood Glucose.: 134 mg/dL (08 Jul 2024 05:10)  POCT Blood Glucose.: 145 mg/dL (07 Jul 2024 23:25)    I&O's Summary    07 Jul 2024 07:01  -  08 Jul 2024 07:00  --------------------------------------------------------  IN: 0 mL / OUT: 0 mL / NET: 0 mL        PHYSICAL EXAM:  Vital Signs Last 24 Hrs  T(C): 36.9 (08 Jul 2024 16:09), Max: 36.9 (08 Jul 2024 16:09)  T(F): 98.5 (08 Jul 2024 16:09), Max: 98.5 (08 Jul 2024 16:09)  HR: 78 (08 Jul 2024 16:09) (78 - 90)  BP: 119/66 (08 Jul 2024 16:09) (101/55 - 119/66)  BP(mean): 76 (08 Jul 2024 11:00) (76 - 76)  RR: 18 (08 Jul 2024 16:09) (16 - 18)  SpO2: 96% (08 Jul 2024 16:09) (96% - 100%)    Parameters below as of 08 Jul 2024 16:09  Patient On (Oxygen Delivery Method): room air    CONSTITUTIONAL: NAD, well-developed, well-groomed  EYES: PERRLA; conjunctiva and sclera clear  ENMT: Moist oral mucosa, no pharyngeal injection or exudates; normal dentition  NECK: Supple, no palpable masses; no thyromegaly  RESPIRATORY: Normal respiratory effort; lungs are clear to auscultation bilaterally  CARDIOVASCULAR: Regular rate and rhythm, normal S1 and S2, no murmur/rub/gallop; No lower extremity edema  ABDOMEN: Soft, Nondistended, Nontender to palpation, normoactive bowel sounds  MUSCULOSKELETAL: No clubbing or cyanosis of digits; no joint swelling or tenderness to palpation  PSYCH: A+O to person, place, and time; affect appropriate  NEUROLOGY: CN 2-12 are intact and symmetric; no gross sensory deficits   SKIN: No rashes; no palpable lesions    LABS:                        8.4    9.72  )-----------( 184      ( 08 Jul 2024 09:23 )             24.3     07-08    144  |  111<H>  |  29<H>  ----------------------------<  124<H>  3.8   |  26  |  0.86    Ca    7.9<L>      08 Jul 2024 02:31    TPro  5.7<L>  /  Alb  3.4  /  TBili  0.3  /  DBili  x   /  AST  15  /  ALT  19  /  AlkPhos  38<L>  07-06    PT/INR - ( 08 Jul 2024 02:31 )   PT: 13.6 sec;   INR: 1.24 ratio         PTT - ( 08 Jul 2024 02:31 )  PTT:26.0 sec      Urinalysis Basic - ( 08 Jul 2024 02:31 )    Color: x / Appearance: x / SG: x / pH: x  Gluc: 124 mg/dL / Ketone: x  / Bili: x / Urobili: x   Blood: x / Protein: x / Nitrite: x   Leuk Esterase: x / RBC: x / WBC x   Sq Epi: x / Non Sq Epi: x / Bacteria: x      RADIOLOGY & ADDITIONAL TESTS:  Results Reviewed:   Imaging Personally Reviewed:  7/8/24 EGD:  Impression:          - No active bleeding seen.                       - Non-bleeding gastric ulcer. Possible source of melena.                       - Mild gastritis.                       - Otherwise normal endosopy                       - Biopsies were taken with a cold forceps for Helicobacter pylori testing.  Recommendation:      - Use a proton pump inhibitor PO BID for 2 weeks, then once daily.                       - Await pathology results.                       - Perform a colonoscopy today.    7/8/24 Colonoscopy:  Findings:       The perianal and digital rectal examinations were normal.       The terminal ileum appeared normal.       Non-bleeding hemorrhoids were found during retroflexion. The hemorrhoids were mild.       The exam was otherwise normal throughout the examined colon.                                                                                                        Impression:          - The examined portion of the ileum was normal.                       - Non-bleeding hemorrhoids.                       - No specimens collected.                       - No evidence of bleeding seen on this exam of the colon.  Recommendation:      - [facility] patient to [facility] for ongoing care.                       - Clear liquid diet.                       - Monitor H/H. If bleeding continues, consider capsule endoscopy.                                                                                                        Electrocardiogram Personally Reviewed:    COORDINATION OF CARE:  Care Discussed with Consultants/Other Providers [Y]: medicine NEIL Sears  Prior or Outpatient Records Reviewed [Y/N]:

## 2024-07-09 ENCOUNTER — TRANSCRIPTION ENCOUNTER (OUTPATIENT)
Age: 64
End: 2024-07-09

## 2024-07-09 VITALS — WEIGHT: 229.94 LBS

## 2024-07-09 LAB
ANION GAP SERPL CALC-SCNC: 8 MMOL/L — SIGNIFICANT CHANGE UP (ref 5–17)
BUN SERPL-MCNC: 14 MG/DL — SIGNIFICANT CHANGE UP (ref 7–23)
CALCIUM SERPL-MCNC: 8.1 MG/DL — LOW (ref 8.4–10.5)
CHLORIDE SERPL-SCNC: 107 MMOL/L — SIGNIFICANT CHANGE UP (ref 96–108)
CO2 SERPL-SCNC: 26 MMOL/L — SIGNIFICANT CHANGE UP (ref 22–31)
CREAT SERPL-MCNC: 0.9 MG/DL — SIGNIFICANT CHANGE UP (ref 0.5–1.3)
EGFR: 96 ML/MIN/1.73M2 — SIGNIFICANT CHANGE UP
GLUCOSE BLDC GLUCOMTR-MCNC: 129 MG/DL — HIGH (ref 70–99)
GLUCOSE SERPL-MCNC: 112 MG/DL — HIGH (ref 70–99)
HCT VFR BLD CALC: 22.5 % — LOW (ref 39–50)
HGB BLD-MCNC: 7.6 G/DL — LOW (ref 13–17)
MAGNESIUM SERPL-MCNC: 2.1 MG/DL — SIGNIFICANT CHANGE UP (ref 1.6–2.6)
MCHC RBC-ENTMCNC: 30.2 PG — SIGNIFICANT CHANGE UP (ref 27–34)
MCHC RBC-ENTMCNC: 33.8 GM/DL — SIGNIFICANT CHANGE UP (ref 32–36)
MCV RBC AUTO: 89.3 FL — SIGNIFICANT CHANGE UP (ref 80–100)
NRBC # BLD: 0 /100 WBCS — SIGNIFICANT CHANGE UP (ref 0–0)
PLATELET # BLD AUTO: 193 K/UL — SIGNIFICANT CHANGE UP (ref 150–400)
POTASSIUM SERPL-MCNC: 3.9 MMOL/L — SIGNIFICANT CHANGE UP (ref 3.5–5.3)
POTASSIUM SERPL-SCNC: 3.9 MMOL/L — SIGNIFICANT CHANGE UP (ref 3.5–5.3)
RBC # BLD: 2.52 M/UL — LOW (ref 4.2–5.8)
RBC # FLD: 14.4 % — SIGNIFICANT CHANGE UP (ref 10.3–14.5)
SODIUM SERPL-SCNC: 141 MMOL/L — SIGNIFICANT CHANGE UP (ref 135–145)
WBC # BLD: 7.22 K/UL — SIGNIFICANT CHANGE UP (ref 3.8–10.5)
WBC # FLD AUTO: 7.22 K/UL — SIGNIFICANT CHANGE UP (ref 3.8–10.5)

## 2024-07-09 PROCEDURE — 99239 HOSP IP/OBS DSCHRG MGMT >30: CPT

## 2024-07-09 RX ORDER — PANTOPRAZOLE SODIUM 40 MG/10ML
1 INJECTION, POWDER, FOR SOLUTION INTRAVENOUS
Qty: 44 | Refills: 0
Start: 2024-07-09 | End: 2024-07-22

## 2024-07-09 RX ADMIN — PANTOPRAZOLE SODIUM 40 MILLIGRAM(S): 40 INJECTION, POWDER, FOR SOLUTION INTRAVENOUS at 05:08

## 2024-07-09 NOTE — DISCHARGE NOTE PROVIDER - HOSPITAL COURSE
63 y/o male with PMHx of HTN and DM2 who presents with rectal bleed and acute blood loss anemia requiring PRBC transfusion. Now s/p EGD/colonoscopy with no signs of active bleed though non-bleeding gastric ulcer and mild gastritis seen on endoscopic eval.       Problem/Plan - 1:  ·  Problem: Rectal bleed.   ·  Plan: - BRBPR preceded by dark black stool, associated with dizziness, dyspnea, hypotension  - s/p 1u PRBC in ED and additional 1u PRBC on 7/8  - CT with IV contrast showing no active bleeding in GI tract  - GI consulted, recs appreciated  - 7/8 EGD with no active bleed but non-bleeding gastric ulcer seen with mild gastritis. biopsies obtained.  - 7/8 Colonoscopy with no evidence of bleed; non-bleeding hemorrhoids seen  - c/w clears for now  - if bleeding continues, may need capsule study  - pantoprazole 40mg BID x 2 weeks followed by daily as per GI  - monitor H/H and transfuse for Hb<7.     Problem/Plan - 2:  ·  Problem: DM2 (diabetes mellitus, type 2).   ·  Plan: well controlled. on ozempic.  HbA1c 5.5%  - c/w low dose sliding scale  - carb consistent diet.     Problem/Plan - 3:  ·  Problem: HTN (hypertension).   ·  Plan: BP remains marginal.   - continue to hold home lisinopril  - monitor vitals.     Problem/Plan - 4:  ·  Problem: Preventive measure.   ·  Plan: DVT ppx: SCDs  GI ppx: on PPI   65 y/o male with PMHx of HTN and DM2 who presents with rectal bleed and acute blood loss anemia requiring PRBC transfusion. Now s/p EGD/colonoscopy with no signs of active bleed though non-bleeding gastric ulcer and mild gastritis seen on endoscopic eval.       Problem/Plan - 1:  ·  Problem: Rectal bleed.   ·  Plan: - BRBPR preceded by dark black stool, associated with dizziness, dyspnea, hypotension  - s/p 1u PRBC in ED and additional 1u PRBC on 7/8  - CT with IV contrast showing no active bleeding in GI tract  - GI consulted, recs appreciated  - 7/8 EGD with no active bleed but non-bleeding gastric ulcer seen with mild gastritis. biopsies obtained.  - 7/8 Colonoscopy with no evidence of bleed; non-bleeding hemorrhoids seen  - tolerated diet advancement  - no further bleeding noted  - pantoprazole 40mg BID x 2 weeks followed by daily as per GI  - monitored H/H - stable     Problem/Plan - 2:  ·  Problem: DM2 (diabetes mellitus, type 2).   ·  Plan: well controlled. on ozempic.  HbA1c 5.5%  - c/w low dose sliding scale  - carb consistent diet.     Problem/Plan - 3:  ·  Problem: HTN (hypertension).   ·  Plan: BP remains marginal.   - continue to hold home lisinopril  - vital stable    Patient medically cleared for discharge, by Dr. Parnell, with PCP and GI follow up on discharge.

## 2024-07-09 NOTE — CHART NOTE - NSCHARTNOTEFT_GEN_A_CORE
Hospitalist Discharge Day Note    Patient seen and examined today 7/9/24.    Interval History: no acute events overnight. no further episodes of melena nor hematochezia. States had normal clear/brown BM overnight. denies any dizziness, lightheadedness, abd pain, n/v. Feels well overall. eager to go home.    CONSTITUTIONAL: NAD, well-developed, well-groomed  EYES: PERRLA; conjunctiva and sclera clear  ENMT: Moist oral mucosa, no pharyngeal injection or exudates; normal dentition  NECK: Supple, no palpable masses; no thyromegaly  RESPIRATORY: Normal respiratory effort; lungs are clear to auscultation bilaterally  CARDIOVASCULAR: Regular rate and rhythm, normal S1 and S2, no murmur/rub/gallop; No lower extremity edema  ABDOMEN: Soft, Non-distended, Nontender to palpation, normoactive bowel sounds  MUSCULOSKELETAL:  No clubbing or cyanosis of digits; no joint swelling or tenderness to palpation  PSYCH: A+O to person, place, and time; affect appropriate  NEUROLOGY: CN 2-12 are intact and symmetric; no gross sensory deficits   SKIN: No rashes; no palpable lesions    Assessment: 63 yo male with PMH of HTN and DM2 who presents with rectal bleed and acute blood loss anemia requiring PRBC transfusion. Now s/p EGD/colonoscopy with no signs of active bleed though non-bleeding gastric ulcer and mild gastritis seen on endoscopic eval.    Plan:  - H/H stable  - no further episodes of melena nor hematochezia  - c/w pantoprazole 40mg PO BID x 2 weeks followed by once daily as per GI  - outpatient f/u with GI Attending Dr. Wallace to discuss biopsy results +/- outpatient capsule endoscopy  - will continue to hold lisinopril on d/c for marginal BP with plan for f/u with PCP in 1 week for    Medically clear for discharge home today 7/9/24.   Will need outpatient f/u with PCP within 1 week for BP check and GI Attending Dr. Wallace in 1-2 weeks for biopsy results +/- outpatient capsule study.  Discharge planning time spent: 37 minutes.    Plan discussed with patient, wife bedside, GI Attending Dr. Wallace, and medicine NP Renu.    nAabel Parnell MD  Division of Hospital Medicine  [facility]   Available on [employer] Teams - messages preferred prior to calls.

## 2024-07-09 NOTE — DIETITIAN INITIAL EVALUATION ADULT - PROBLEM SELECTOR PLAN 1
- BRBPR preceded by dark black stool, associated with dizziness, dyspnea, hypotension  - s/p 1u PRBC in ED, pt continues to have bleeding with BM  - CT with IV contrast showing no active bleeding in GI tract  - Possibly due to hemorrhoids not visualized on CT  - GI consulted, f/u recs  - PPI IV BID  - Monitor Hb, transfuse >7 or if symptomatic

## 2024-07-09 NOTE — DISCHARGE NOTE PROVIDER - NSDCMRMEDTOKEN_GEN_ALL_CORE_FT
lisinopril 20 mg oral tablet: 1 tab(s) orally once a day  Ozempic 2 mg/1.5 mL (0.25 mg or 0.5 mg dose) subcutaneous solution: 0.5 milligram(s) subcutaneously once a week on Sundays   Ozempic 2 mg/1.5 mL (0.25 mg or 0.5 mg dose) subcutaneous solution: 0.5 milligram(s) subcutaneously once a week on Sundays  pantoprazole 40 mg oral delayed release tablet: 1 tab(s) orally 2 times a day Take one tab oral twice a day x 14 days, then decrease to one tab oral daily

## 2024-07-09 NOTE — DIETITIAN INITIAL EVALUATION ADULT - PERTINENT MEDS FT
MEDICATIONS  (STANDING):  dextrose 10% Bolus 125 milliLiter(s) IV Bolus once  dextrose 5%. 1000 milliLiter(s) (50 mL/Hr) IV Continuous <Continuous>  dextrose 5%. 1000 milliLiter(s) (100 mL/Hr) IV Continuous <Continuous>  dextrose 50% Injectable 12.5 Gram(s) IV Push once  dextrose 50% Injectable 25 Gram(s) IV Push once  glucagon  Injectable 1 milliGRAM(s) IntraMuscular once  insulin lispro (ADMELOG) corrective regimen sliding scale   SubCutaneous at bedtime  insulin lispro (ADMELOG) corrective regimen sliding scale   SubCutaneous three times a day before meals  pantoprazole    Tablet 40 milliGRAM(s) Oral two times a day    MEDICATIONS  (PRN):  dextrose Oral Gel 15 Gram(s) Oral once PRN Blood Glucose LESS THAN 70 milliGRAM(s)/deciliter

## 2024-07-09 NOTE — DISCHARGE NOTE NURSING/CASE MANAGEMENT/SOCIAL WORK - PATIENT PORTAL LINK FT
You can access the FollowMyHealth Patient Portal offered by [facility] by registering at the following website: [email or web address]. By joining [employer]’s FollowMyHealth portal, you will also be able to view your health information using other applications (apps) compatible with our system.

## 2024-07-09 NOTE — DIETITIAN INITIAL EVALUATION ADULT - OTHER INFO
Weight: Pt reports UBW used to be  ~ 260lbs prior to Ozempic (has been on it for ~ 1 year). Current dosing weight is 229.5lbs.

## 2024-07-09 NOTE — DIETITIAN INITIAL EVALUATION ADULT - ORAL INTAKE PTA/DIET HISTORY
Pt reports good PO intake and appetite PTA, consumes general healthy diet consisting of high fiber foods. NKFA. Pt denies chewing/swallowing difficulty. Pt admitted with one day of nausea, abdominal pain, bloody bowel movement.  Pt reports good PO intake and appetite PTA, consumes general healthy diet consisting of high fiber foods. Vegetarian (consumes dairy, no eggs) NKFA. Pt denies chewing/swallowing difficulty. Pt admitted with one day of nausea, abdominal pain, bloody bowel movements.

## 2024-07-09 NOTE — DISCHARGE NOTE PROVIDER - NSDCFUADDAPPT_GEN_ALL_CORE_FT
You must follow up with your primary medical doctor within 2-3 days of discharge - please call to make an appointment.  At this appointment, you will need a CBC drawn to monitor your hemoglobin/hematocrit.  You must also discuss if/when to restart your lisinopril.      You must follow up with your gastroenterologist, Dr. Wallace, within one week of discharge - please call to make an appointment.            APPTS ARE READY TO BE MADE: [X ] YES    Best Family or Patient Contact (if needed):    Additional Information about above appointments (if needed):    1: Primary medical doctor  2: Gastroenterologist  3:     Other comments or requests:    You must follow up with your primary medical doctor within 2-3 days of discharge - please call to make an appointment.  At this appointment, you will need a CBC drawn to monitor your hemoglobin/hematocrit.  You must also discuss if/when to restart your lisinopril.    You must follow up with your gastroenterologist, Dr. Wallace, within one week of discharge - please call to make an appointment.      APPTS ARE READY TO BE MADE: [X] YES    Best Family or Patient Contact (if needed):    Additional Information about above appointments (if needed):    1: Primary medical doctor  2: Gastroenterologist  3:     Other comments or requests:    You must follow up with your primary medical doctor within 2-3 days of discharge - please call to make an appointment.  At this appointment, you will need a CBC drawn to monitor your hemoglobin/hematocrit.  You must also discuss if/when to restart your lisinopril.    You must follow up with your gastroenterologist, Dr. Wallace, within one week of discharge - please call to make an appointment.      APPTS ARE READY TO BE MADE: [X] YES    Best Family or Patient Contact (if needed):    Additional Information about above appointments (if needed):    1: Primary medical doctor  2: Gastroenterologist  3:     Other comments or requests:     Provided patient with provider referral information, however patient prefers to schedule the appointments on their own.

## 2024-07-09 NOTE — DIETITIAN INITIAL EVALUATION ADULT - REASON FOR ADMISSION
Hemorrhage of rectum and anus    Chart reviewed, events noted. This is a "65 y/o male with PMHx of HTN and DM2 who presents with rectal bleed and acute blood loss anemia requiring PRBC transfusion. Now s/p EGD/colonoscopy with no signs of active bleed though non-bleeding gastric ulcer and mild gastritis seen on endoscopic eval."

## 2024-07-09 NOTE — DISCHARGE NOTE NURSING/CASE MANAGEMENT/SOCIAL WORK - NSDCPEPTCAREGIVEDUMATLIST _GEN_ALL_CORE
Diabetes FREE:[LAST:[Luis Armando Simon],FIRST:[Micky],PHONE:[phone]],FAX:[(   )    -],ADDRESS:[Primary Address:    [place] [address]],SCHEDULEDAPPT:[09/11/2023]] FREE:[LAST:[Luis Armando Simon],FIRST:[Micky],PHONE:[phone]:[phone]   )    -],ADDRESS:[Primary Address:    [place] [address]],SCHEDULEDAPPT:[09/11/2023],SCHEDULEDAPPTTIME:[11:45 AM]]

## 2024-07-09 NOTE — DISCHARGE NOTE PROVIDER - ATTENDING DISCHARGE PHYSICAL EXAMINATION:
CONSTITUTIONAL: NAD, well-developed, well-groomed  EYES: PERRLA; conjunctiva and sclera clear  ENMT: Moist oral mucosa, no pharyngeal injection or exudates; normal dentition  NECK: Supple, no palpable masses; no thyromegaly  RESPIRATORY: Normal respiratory effort; lungs are clear to auscultation bilaterally  CARDIOVASCULAR: Regular rate and rhythm, normal S1 and S2, no murmur/rub/gallop  ABDOMEN: Soft, Nondistended, Nontender to palpation, normoactive bowel sounds  MUSCULOSKELETAL: No clubbing or cyanosis of digits; no joint swelling or tenderness to palpation  PSYCH: A+O to person, place, and time; affect appropriate  NEUROLOGY: CN 2-12 are intact and symmetric; no gross sensory deficits   SKIN: No rashes; no palpable lesions

## 2024-07-09 NOTE — DISCHARGE NOTE NURSING/CASE MANAGEMENT/SOCIAL WORK - NSDCFUADDAPPT_GEN_ALL_CORE_FT
You must follow up with your primary medical doctor within 2-3 days of discharge - please call to make an appointment.  At this appointment, you will need a CBC drawn to monitor your hemoglobin/hematocrit.  You must also discuss if/when to restart your lisinopril.      You must follow up with your gastroenterologist, Dr. Wallace, within one week of discharge - please call to make an appointment.            APPTS ARE READY TO BE MADE: [X ] YES    Best Family or Patient Contact (if needed):    Additional Information about above appointments (if needed):    1: Primary medical doctor  2: Gastroenterologist  3:     Other comments or requests:

## 2024-07-09 NOTE — DIETITIAN INITIAL EVALUATION ADULT - ENERGY INTAKE
IN-house pt reports good tolerance to clear liquid diet. No nausea, emesis noted. + bowel movement (clear per pt report) Fair (50-75%)

## 2024-07-09 NOTE — DIETITIAN INITIAL EVALUATION ADULT - ETIOLOGY
related to inability to consume adequate nutrition in the setting of altered GI function (bloody bowel movements, abdominal pain)

## 2024-07-09 NOTE — DISCHARGE NOTE PROVIDER - NSDCCPCAREPLAN_GEN_ALL_CORE_FT
PRINCIPAL DISCHARGE DIAGNOSIS  Diagnosis: Rectal bleed  Assessment and Plan of Treatment: EGD showed non-bleeding gastric ulcer.  Colonoscopy showed non-bleeding hemorrhoids.  You must follow up with your gastroenterologist within one week of discharge - please call to make an appointment.  You will discuss your biopsy results at this time.  You must follow up with your primary medical doctor within 2-3 days of discharge - at [facility] appointment, you will need a CBC drawn to monitor your hemoglobin/hematocrit.      SECONDARY DISCHARGE DIAGNOSES  Diagnosis: HTN (hypertension)  Assessment and Plan of Treatment: You will stop your lisinopril for now.  You must follow up with your primary medical doctor within 2-3 days of discharge.  At this time, you will have your blood pressure checked and discuss if/when you should restart this medication.  Low salt diet  Activity as tolerated.  Follow up with your medical doctor for routine blood pressure monitoring at your next visit.  Notify your doctor if you have any of the following symptoms:   Dizziness, Lightheadedness, Blurry vision, Headache, Chest pain, Shortness of breath      Diagnosis: DM2 (diabetes mellitus, type 2)  Assessment and Plan of Treatment: Make sure you get your HgA1c checked every three months.  If you take oral diabetes medications, check your blood glucose two times a day.  If you take insulin, check your blood glucose before meals and at bedtime.  It's important not to skip any meals.  Keep a log of your blood glucose results and always take it with you to your doctor appointments.  Keep a list of your current medications including injectables and over the counter medications and bring this medication list with you to all your doctor appointments.  If you have not seen your ophthalmologist this year call for appointment.  Check your feet daily for redness, sores, or openings. Do not self treat. If no improvement in two days call your primary care physician for an appointment.  Low blood sugar (hypoglycemia) is a blood sugar below 70mg/dl. Check your blood sugar if you feel signs/symptoms of hypoglycemia. If your blood sugar is below 70 take 15 grams of carbohydrates (ex 4 oz of apple juice, 3-4 glucose tablets, or 4-6 oz of regular soda) wait 15 minutes and repeat blood sugar to make sure it comes up above 70.  If your blood sugar is above 70 and you are due for a meal, have a meal.  If you are not due for a meal have a snack.  This snack helps keeps your blood sugar at a safe range.       PRINCIPAL DISCHARGE DIAGNOSIS  Diagnosis: Rectal bleed  Assessment and Plan of Treatment: EGD showed non-bleeding gastric ulcer.  Colonoscopy showed non-bleeding hemorrhoids.  You will take protonix 40mg oral twice a day x 2 weeks, and then decrease to one tab daily.  You must follow up with your gastroenterologist within one week of discharge - please call to make an appointment.  You will discuss your biopsy results at this time.  You must follow up with your primary medical doctor within 2-3 days of discharge - at [facility] appointment, you will need a CBC drawn to monitor your hemoglobin/hematocrit.      SECONDARY DISCHARGE DIAGNOSES  Diagnosis: HTN (hypertension)  Assessment and Plan of Treatment: You will stop your lisinopril for now.  You must follow up with your primary medical doctor within 2-3 days of discharge.  At this time, you will have your blood pressure checked and discuss if/when you should restart this medication.  Low salt diet  Activity as tolerated.  Follow up with your medical doctor for routine blood pressure monitoring at your next visit.  Notify your doctor if you have any of the following symptoms:   Dizziness, Lightheadedness, Blurry vision, Headache, Chest pain, Shortness of breath      Diagnosis: DM2 (diabetes mellitus, type 2)  Assessment and Plan of Treatment: Make sure you get your HgA1c checked every three months.  If you take oral diabetes medications, check your blood glucose two times a day.  If you take insulin, check your blood glucose before meals and at bedtime.  It's important not to skip any meals.  Keep a log of your blood glucose results and always take it with you to your doctor appointments.  Keep a list of your current medications including injectables and over the counter medications and bring this medication list with you to all your doctor appointments.  If you have not seen your ophthalmologist this year call for appointment.  Check your feet daily for redness, sores, or openings. Do not self treat. If no improvement in two days call your primary care physician for an appointment.  Low blood sugar (hypoglycemia) is a blood sugar below 70mg/dl. Check your blood sugar if you feel signs/symptoms of hypoglycemia. If your blood sugar is below 70 take 15 grams of carbohydrates (ex 4 oz of apple juice, 3-4 glucose tablets, or 4-6 oz of regular soda) wait 15 minutes and repeat blood sugar to make sure it comes up above 70.  If your blood sugar is above 70 and you are due for a meal, have a meal.  If you are not due for a meal have a snack.  This snack helps keeps your blood sugar at a safe range.       PRINCIPAL DISCHARGE DIAGNOSIS  Diagnosis: Rectal bleed  Assessment and Plan of Treatment: EGD showed non-bleeding gastric ulcer.  Colonoscopy showed non-bleeding hemorrhoids.  You will take protonix 40mg oral twice a day x 2 weeks, and then decrease to one tab daily.  You must follow up with your gastroenterologist within one week of discharge - please call to make an appointment.  You will discuss your biopsy results at this time.  You must follow up with your primary medical doctor within 2-3 days of discharge - at [facility] appointment, you will need a CBC drawn to monitor your hemoglobin/hematocrit.      SECONDARY DISCHARGE DIAGNOSES  Diagnosis: DM2 (diabetes mellitus, type 2)  Assessment and Plan of Treatment: Make sure you get your HgA1c checked every three months.  If you take oral diabetes medications, check your blood glucose two times a day.  If you take insulin, check your blood glucose before meals and at bedtime.  It's important not to skip any meals.  Keep a log of your blood glucose results and always take it with you to your doctor appointments.  Keep a list of your current medications including injectables and over the counter medications and bring this medication list with you to all your doctor appointments.  If you have not seen your ophthalmologist this year call for appointment.  Check your feet daily for redness, sores, or openings. Do not self treat. If no improvement in two days call your primary care physician for an appointment.  Low blood sugar (hypoglycemia) is a blood sugar below 70mg/dl. Check your blood sugar if you feel signs/symptoms of hypoglycemia. If your blood sugar is below 70 take 15 grams of carbohydrates (ex 4 oz of apple juice, 3-4 glucose tablets, or 4-6 oz of regular soda) wait 15 minutes and repeat blood sugar to make sure it comes up above 70.  If your blood sugar is above 70 and you are due for a meal, have a meal.  If you are not due for a meal have a snack.  This snack helps keeps your blood sugar at a safe range.      Diagnosis: HTN (hypertension)  Assessment and Plan of Treatment: You will stop your lisinopril for now.  You must follow up with your primary medical doctor within 2-3 days of discharge.  At this time, you will have your blood pressure checked and discuss if/when you should restart this medication.  Low salt diet  Activity as tolerated.  Follow up with your medical doctor for routine blood pressure monitoring at your next visit.  Notify your doctor if you have any of the following symptoms:   Dizziness, Lightheadedness, Blurry vision, Headache, Chest pain, Shortness of breath       PRINCIPAL DISCHARGE DIAGNOSIS  Diagnosis: GI bleed  Assessment and Plan of Treatment: EGD showed non-bleeding gastric ulcer.  Colonoscopy showed non-bleeding hemorrhoids.  You will take protonix 40mg oral twice a day x 2 weeks, and then decrease to one tab daily.  You must follow up with your gastroenterologist within one week of discharge - please call to make an appointment.  You will discuss your biopsy results at this time.  You must follow up with your primary medical doctor within 2-3 days of discharge - at \[facility] [place] [facility]\"" appointment, you will need a CBC drawn to monitor your hemoglobin/hematocrit.      SECONDARY DISCHARGE DIAGNOSES  Diagnosis: Gastric ulcer  Assessment and Plan of Treatment: You were found to have a non-bleeding gastric ulcer on endoscopy  Please take pantoprazole as instructed  Please follow-up with GI as outpatient for biopsy results    Diagnosis: DM2 (diabetes mellitus, type 2)  Assessment and Plan of Treatment: Make sure you get your HgA1c checked every three months.  If you take oral diabetes medications, check your blood glucose two times a day.  If you take insulin, check your blood glucose before meals and at bedtime.  It's important not to skip any meals.  Keep a log of your blood glucose results and always take it with you to your doctor appointments.  Keep a list of your current medications including injectables and over the counter medications and bring this medication list with you to all your doctor appointments.  If you have not seen your ophthalmologist this year call for appointment.  Check your feet daily for redness, sores, or openings. Do not self treat. If no improvement in two days call your primary care physician for an appointment.  Low blood sugar (hypoglycemia) is a blood sugar below 70mg/dl. Check your blood sugar if you feel signs/symptoms of hypoglycemia. If your blood sugar is below 70 take 15 grams of carbohydrates (ex 4 oz of apple juice, 3-4 glucose tablets, or 4-6 oz of regular soda) wait 15 minutes and repeat blood sugar to make sure it comes up above 70.  If your blood sugar is above 70 and you are due for a meal, have a meal.  If you are not due for a meal have a snack.  This snack helps keeps your blood sugar at a safe range.      Diagnosis: HTN (hypertension)  Assessment and Plan of Treatment: You will stop your lisinopril for now.  You must follow up with your primary medical doctor within 2-3 days of discharge.  At this time, you will have your blood pressure checked and discuss if/when you should restart this medication.  Low salt diet  Activity as tolerated.  Follow up with your medical doctor for routine blood pressure monitoring at your next visit.  Notify your doctor if you have any of the following symptoms:   Dizziness, Lightheadedness, Blurry vision, Headache, Chest pain, Shortness of breath

## 2024-07-09 NOTE — CHART NOTE - NSCHARTNOTEFT_GEN_A_CORE
Request from Dr. Parnell to facilitate patient discharge.  Medication reconciliation reviewed, revised, and resolved with Dr. Parnell, who has medically cleared patient for discharge with follow up as advised.  Please refer to discharge note for detailed hospital course.

## 2024-07-09 NOTE — DIETITIAN INITIAL EVALUATION ADULT - PROBLEM SELECTOR PLAN 4
- DVT ppx: SCDs  - GI ppx: PPI  - Diet: CLD for now pending GI     - f/u nutrition recs     - Aspiration precautions  - Activity:      - f/u PT recs     - Fall precautions  - Dispo: Pending clinical course  - GOC: Full code

## 2024-07-09 NOTE — DISCHARGE NOTE PROVIDER - PROVIDER TOKENS
PROVIDER:[TOKEN:[[id number]],FOLLOWUP:[1-3 days]],PROVIDER:[TOKEN:[[id number]],FOLLOWUP:[1 week]]

## 2024-07-09 NOTE — DISCHARGE NOTE PROVIDER - CARE PROVIDER_API CALL
Paloma Nieves  Family Medicine  [address] [place] [id number]  Phone: [phone]  Fax: [phone]  Follow Up Time: 1-3 days    Adria Wallace  Gastroenterology  [address] [place] [id number]  Phone: [phone]  Fax: [phone]  Follow Up Time: 1 week

## 2024-07-09 NOTE — DIETITIAN INITIAL EVALUATION ADULT - ADD RECOMMEND
1) Continue current diet as tolerated. Add Lactovegetarian diet modifier per pt preference. 2) Encourage PO intake of protein-rich foods. 3) RD to remain available and follow-up as medically appropriate.

## 2024-07-12 LAB — SURGICAL PATHOLOGY STUDY: SIGNIFICANT CHANGE UP

## 2024-07-15 DIAGNOSIS — B96.81 PEPTIC ULCER, SITE UNSPECIFIED, UNSPECIFIED AS ACUTE OR CHRONIC, W/OUT HEMORRHAGE OR PERFORATION: ICD-10-CM

## 2024-07-15 DIAGNOSIS — K27.9 PEPTIC ULCER, SITE UNSPECIFIED, UNSPECIFIED AS ACUTE OR CHRONIC, W/OUT HEMORRHAGE OR PERFORATION: ICD-10-CM

## 2024-07-15 RX ORDER — PANTOPRAZOLE 40 MG/1
40 TABLET, DELAYED RELEASE ORAL
Qty: 28 | Refills: 0 | Status: ACTIVE | COMMUNITY
Start: 2024-07-15 | End: 1900-01-01

## 2024-07-15 RX ORDER — CLARITHROMYCIN 500 MG/1
500 TABLET, FILM COATED ORAL
Qty: 28 | Refills: 0 | Status: ACTIVE | COMMUNITY
Start: 2024-07-15 | End: 1900-01-01

## 2024-07-15 RX ORDER — AMOXICILLIN 500 MG/1
500 TABLET, FILM COATED ORAL
Qty: 56 | Refills: 0 | Status: ACTIVE | COMMUNITY
Start: 2024-07-15 | End: 1900-01-01

## 2024-07-17 RX ORDER — SEMAGLUTIDE 1.34 MG/ML
0.5 INJECTION, SOLUTION SUBCUTANEOUS
Refills: 0 | DISCHARGE

## 2024-07-17 RX ORDER — LISINOPRIL 5 MG/1
1 TABLET ORAL
Refills: 0 | DISCHARGE

## 2024-09-03 NOTE — DIETITIAN INITIAL EVALUATION ADULT - PERTINENT LABORATORY DATA
07-09    141  |  107  |  14  ----------------------------<  112<H>  3.9   |  26  |  0.90    Ca    8.1<L>      09 Jul 2024 07:59  Mg     2.1     07-09    POCT Blood Glucose.: 104 mg/dL (07-08-24 @ 21:07)  A1C with Estimated Average Glucose Result: 5.5 % (07-08-24 @ 02:31)  
Never

## 2024-12-16 ENCOUNTER — APPOINTMENT (OUTPATIENT)
Dept: GASTROENTEROLOGY | Facility: CLINIC | Age: 64
End: 2024-12-16
